# Patient Record
Sex: MALE | Race: WHITE | Employment: FULL TIME | URBAN - METROPOLITAN AREA
[De-identification: names, ages, dates, MRNs, and addresses within clinical notes are randomized per-mention and may not be internally consistent; named-entity substitution may affect disease eponyms.]

---

## 2017-01-10 ENCOUNTER — OFFICE VISIT (OUTPATIENT)
Dept: INTERNAL MEDICINE CLINIC | Age: 22
End: 2017-01-10

## 2017-01-10 VITALS
SYSTOLIC BLOOD PRESSURE: 100 MMHG | HEART RATE: 153 BPM | BODY MASS INDEX: 24.59 KG/M2 | WEIGHT: 153 LBS | HEIGHT: 66 IN | OXYGEN SATURATION: 98 % | DIASTOLIC BLOOD PRESSURE: 62 MMHG

## 2017-01-10 DIAGNOSIS — F41.9 ANXIETY: ICD-10-CM

## 2017-01-10 DIAGNOSIS — Z76.89 ENCOUNTER TO ESTABLISH CARE: ICD-10-CM

## 2017-01-10 DIAGNOSIS — I49.9 CARDIAC ARRHYTHMIA, UNSPECIFIED CARDIAC ARRHYTHMIA TYPE: Primary | ICD-10-CM

## 2017-01-10 PROCEDURE — 36415 COLL VENOUS BLD VENIPUNCTURE: CPT | Performed by: NURSE PRACTITIONER

## 2017-01-10 PROCEDURE — 99203 OFFICE O/P NEW LOW 30 MIN: CPT | Performed by: NURSE PRACTITIONER

## 2017-01-10 PROCEDURE — 93000 ELECTROCARDIOGRAM COMPLETE: CPT | Performed by: NURSE PRACTITIONER

## 2017-01-10 RX ORDER — BUSPIRONE HYDROCHLORIDE 5 MG/1
5 TABLET ORAL 3 TIMES DAILY
Qty: 90 TABLET | Refills: 1 | Status: SHIPPED | OUTPATIENT
Start: 2017-01-10 | End: 2017-04-24

## 2017-01-10 ASSESSMENT — PATIENT HEALTH QUESTIONNAIRE - PHQ9
SUM OF ALL RESPONSES TO PHQ QUESTIONS 1-9: 17
5. POOR APPETITE OR OVEREATING: 1
8. MOVING OR SPEAKING SO SLOWLY THAT OTHER PEOPLE COULD HAVE NOTICED. OR THE OPPOSITE, BEING SO FIGETY OR RESTLESS THAT YOU HAVE BEEN MOVING AROUND A LOT MORE THAN USUAL: 3
1. LITTLE INTEREST OR PLEASURE IN DOING THINGS: 2
7. TROUBLE CONCENTRATING ON THINGS, SUCH AS READING THE NEWSPAPER OR WATCHING TELEVISION: 3
9. THOUGHTS THAT YOU WOULD BE BETTER OFF DEAD, OR OF HURTING YOURSELF: 0
4. FEELING TIRED OR HAVING LITTLE ENERGY: 3
3. TROUBLE FALLING OR STAYING ASLEEP: 2
6. FEELING BAD ABOUT YOURSELF - OR THAT YOU ARE A FAILURE OR HAVE LET YOURSELF OR YOUR FAMILY DOWN: 1
2. FEELING DOWN, DEPRESSED OR HOPELESS: 2
10. IF YOU CHECKED OFF ANY PROBLEMS, HOW DIFFICULT HAVE THESE PROBLEMS MADE IT FOR YOU TO DO YOUR WORK, TAKE CARE OF THINGS AT HOME, OR GET ALONG WITH OTHER PEOPLE: 1
SUM OF ALL RESPONSES TO PHQ9 QUESTIONS 1 & 2: 4

## 2017-01-11 ENCOUNTER — TELEPHONE (OUTPATIENT)
Dept: INTERNAL MEDICINE CLINIC | Age: 22
End: 2017-01-11

## 2017-01-11 ENCOUNTER — OFFICE VISIT (OUTPATIENT)
Dept: PSYCHOLOGY | Age: 22
End: 2017-01-11

## 2017-01-11 DIAGNOSIS — F33.1 MODERATE EPISODE OF RECURRENT MAJOR DEPRESSIVE DISORDER (HCC): Primary | ICD-10-CM

## 2017-01-11 DIAGNOSIS — F40.10 SOCIAL ANXIETY DISORDER: ICD-10-CM

## 2017-01-11 LAB
HEPATITIS C ANTIBODY INTERPRETATION: NORMAL
HIV-1 AND HIV-2 ANTIBODIES: NORMAL
VITAMIN D 25-HYDROXY: 28.7 NG/ML

## 2017-01-11 PROCEDURE — 90791 PSYCH DIAGNOSTIC EVALUATION: CPT | Performed by: PSYCHOLOGIST

## 2017-01-11 ASSESSMENT — PATIENT HEALTH QUESTIONNAIRE - PHQ9
1. LITTLE INTEREST OR PLEASURE IN DOING THINGS: 2
6. FEELING BAD ABOUT YOURSELF - OR THAT YOU ARE A FAILURE OR HAVE LET YOURSELF OR YOUR FAMILY DOWN: 1
10. IF YOU CHECKED OFF ANY PROBLEMS, HOW DIFFICULT HAVE THESE PROBLEMS MADE IT FOR YOU TO DO YOUR WORK, TAKE CARE OF THINGS AT HOME, OR GET ALONG WITH OTHER PEOPLE: 1
SUM OF ALL RESPONSES TO PHQ QUESTIONS 1-9: 16
2. FEELING DOWN, DEPRESSED OR HOPELESS: 2
9. THOUGHTS THAT YOU WOULD BE BETTER OFF DEAD, OR OF HURTING YOURSELF: 1
SUM OF ALL RESPONSES TO PHQ9 QUESTIONS 1 & 2: 4
3. TROUBLE FALLING OR STAYING ASLEEP: 1
8. MOVING OR SPEAKING SO SLOWLY THAT OTHER PEOPLE COULD HAVE NOTICED. OR THE OPPOSITE, BEING SO FIGETY OR RESTLESS THAT YOU HAVE BEEN MOVING AROUND A LOT MORE THAN USUAL: 3
5. POOR APPETITE OR OVEREATING: 1
7. TROUBLE CONCENTRATING ON THINGS, SUCH AS READING THE NEWSPAPER OR WATCHING TELEVISION: 3
4. FEELING TIRED OR HAVING LITTLE ENERGY: 2

## 2017-01-12 PROBLEM — F33.1 MODERATE EPISODE OF RECURRENT MAJOR DEPRESSIVE DISORDER (HCC): Status: ACTIVE | Noted: 2017-01-12

## 2017-01-12 PROBLEM — F40.10 SOCIAL ANXIETY DISORDER: Status: ACTIVE | Noted: 2017-01-12

## 2017-02-03 ENCOUNTER — OFFICE VISIT (OUTPATIENT)
Dept: PSYCHOLOGY | Age: 22
End: 2017-02-03

## 2017-02-03 DIAGNOSIS — F40.10 SOCIAL ANXIETY DISORDER: Primary | ICD-10-CM

## 2017-02-03 PROCEDURE — 90832 PSYTX W PT 30 MINUTES: CPT | Performed by: PSYCHOLOGIST

## 2017-02-09 ENCOUNTER — OFFICE VISIT (OUTPATIENT)
Dept: INTERNAL MEDICINE CLINIC | Age: 22
End: 2017-02-09

## 2017-02-09 VITALS
OXYGEN SATURATION: 99 % | TEMPERATURE: 98.4 F | BODY MASS INDEX: 25.89 KG/M2 | WEIGHT: 158 LBS | DIASTOLIC BLOOD PRESSURE: 68 MMHG | SYSTOLIC BLOOD PRESSURE: 104 MMHG | HEART RATE: 89 BPM

## 2017-02-09 DIAGNOSIS — H66.93 BILATERAL OTITIS MEDIA, UNSPECIFIED CHRONICITY, UNSPECIFIED OTITIS MEDIA TYPE: Primary | ICD-10-CM

## 2017-02-09 DIAGNOSIS — R09.81 CONGESTION OF NASAL SINUS: ICD-10-CM

## 2017-02-09 PROCEDURE — 99213 OFFICE O/P EST LOW 20 MIN: CPT | Performed by: NURSE PRACTITIONER

## 2017-02-09 RX ORDER — FLUTICASONE PROPIONATE 50 MCG
1 SPRAY, SUSPENSION (ML) NASAL DAILY
Qty: 1 BOTTLE | Refills: 3 | Status: SHIPPED | OUTPATIENT
Start: 2017-02-09 | End: 2017-03-03

## 2017-02-09 ASSESSMENT — ENCOUNTER SYMPTOMS
SINUS PRESSURE: 1
SINUS COMPLAINT: 1
COUGH: 0

## 2017-03-03 ENCOUNTER — OFFICE VISIT (OUTPATIENT)
Dept: ENT CLINIC | Age: 22
End: 2017-03-03

## 2017-03-03 ENCOUNTER — OFFICE VISIT (OUTPATIENT)
Dept: PSYCHOLOGY | Age: 22
End: 2017-03-03

## 2017-03-03 VITALS
DIASTOLIC BLOOD PRESSURE: 64 MMHG | HEART RATE: 84 BPM | WEIGHT: 155 LBS | SYSTOLIC BLOOD PRESSURE: 107 MMHG | HEIGHT: 66 IN | BODY MASS INDEX: 24.91 KG/M2

## 2017-03-03 DIAGNOSIS — H69.83 EUSTACHIAN TUBE DYSFUNCTION, BILATERAL: Primary | ICD-10-CM

## 2017-03-03 DIAGNOSIS — F40.10 SOCIAL ANXIETY DISORDER: Primary | ICD-10-CM

## 2017-03-03 DIAGNOSIS — F33.1 MODERATE EPISODE OF RECURRENT MAJOR DEPRESSIVE DISORDER (HCC): ICD-10-CM

## 2017-03-03 PROBLEM — H69.90 EUSTACHIAN TUBE DYSFUNCTION: Status: ACTIVE | Noted: 2017-03-03

## 2017-03-03 PROBLEM — H69.80 EUSTACHIAN TUBE DYSFUNCTION: Status: ACTIVE | Noted: 2017-03-03

## 2017-03-03 PROCEDURE — 90832 PSYTX W PT 30 MINUTES: CPT | Performed by: PSYCHOLOGIST

## 2017-03-03 PROCEDURE — 99203 OFFICE O/P NEW LOW 30 MIN: CPT | Performed by: OTOLARYNGOLOGY

## 2017-03-03 RX ORDER — NEOMYCIN SULFATE, POLYMYXIN B SULFATE, HYDROCORTISONE 3.5; 10000; 1 MG/ML; [USP'U]/ML; MG/ML
1 SOLUTION/ DROPS AURICULAR (OTIC)
COMMUNITY
End: 2018-05-07

## 2017-03-03 RX ORDER — FLUTICASONE PROPIONATE 50 MCG
1 SPRAY, SUSPENSION (ML) NASAL DAILY
Qty: 1 BOTTLE | Refills: 0 | Status: SHIPPED | OUTPATIENT
Start: 2017-03-03 | End: 2018-05-07

## 2017-03-03 RX ORDER — METHYLPREDNISOLONE 4 MG/1
TABLET ORAL
Qty: 1 KIT | Refills: 0 | Status: SHIPPED | OUTPATIENT
Start: 2017-03-03 | End: 2017-03-09

## 2017-04-07 ENCOUNTER — OFFICE VISIT (OUTPATIENT)
Dept: PSYCHOLOGY | Age: 22
End: 2017-04-07

## 2017-04-07 DIAGNOSIS — F40.10 SOCIAL ANXIETY DISORDER: Primary | ICD-10-CM

## 2017-04-07 DIAGNOSIS — F33.1 MODERATE EPISODE OF RECURRENT MAJOR DEPRESSIVE DISORDER (HCC): ICD-10-CM

## 2017-04-07 PROCEDURE — 90832 PSYTX W PT 30 MINUTES: CPT | Performed by: PSYCHOLOGIST

## 2017-04-24 ENCOUNTER — OFFICE VISIT (OUTPATIENT)
Dept: INTERNAL MEDICINE CLINIC | Age: 22
End: 2017-04-24

## 2017-04-24 VITALS
SYSTOLIC BLOOD PRESSURE: 100 MMHG | DIASTOLIC BLOOD PRESSURE: 60 MMHG | BODY MASS INDEX: 25.18 KG/M2 | OXYGEN SATURATION: 99 % | HEART RATE: 94 BPM | WEIGHT: 156 LBS

## 2017-04-24 DIAGNOSIS — F32.1 MODERATE MAJOR DEPRESSION, SINGLE EPISODE (HCC): ICD-10-CM

## 2017-04-24 DIAGNOSIS — F40.10 SOCIAL ANXIETY DISORDER: ICD-10-CM

## 2017-04-24 DIAGNOSIS — F33.1 MODERATE EPISODE OF RECURRENT MAJOR DEPRESSIVE DISORDER (HCC): Primary | ICD-10-CM

## 2017-04-24 DIAGNOSIS — Z13.31 POSITIVE DEPRESSION SCREENING: ICD-10-CM

## 2017-04-24 PROCEDURE — 99213 OFFICE O/P EST LOW 20 MIN: CPT | Performed by: NURSE PRACTITIONER

## 2017-04-24 PROCEDURE — G8431 POS CLIN DEPRES SCRN F/U DOC: HCPCS | Performed by: NURSE PRACTITIONER

## 2017-04-24 RX ORDER — CITALOPRAM 10 MG/1
10 TABLET ORAL DAILY
Qty: 30 TABLET | Refills: 1 | Status: SHIPPED | OUTPATIENT
Start: 2017-04-24 | End: 2017-06-19 | Stop reason: SDUPTHER

## 2017-05-12 ENCOUNTER — OFFICE VISIT (OUTPATIENT)
Dept: PSYCHOLOGY | Age: 22
End: 2017-05-12

## 2017-05-12 DIAGNOSIS — F40.10 SOCIAL ANXIETY DISORDER: Primary | ICD-10-CM

## 2017-05-12 PROCEDURE — 90832 PSYTX W PT 30 MINUTES: CPT | Performed by: PSYCHOLOGIST

## 2017-06-09 ENCOUNTER — OFFICE VISIT (OUTPATIENT)
Dept: PSYCHOLOGY | Age: 22
End: 2017-06-09

## 2017-06-09 DIAGNOSIS — F40.10 SOCIAL ANXIETY DISORDER: Primary | ICD-10-CM

## 2017-06-09 PROCEDURE — 99999 PR OFFICE/OUTPT VISIT,PROCEDURE ONLY: CPT | Performed by: PSYCHOLOGIST

## 2017-06-19 ENCOUNTER — OFFICE VISIT (OUTPATIENT)
Dept: INTERNAL MEDICINE CLINIC | Age: 22
End: 2017-06-19

## 2017-06-19 VITALS
HEART RATE: 89 BPM | OXYGEN SATURATION: 99 % | SYSTOLIC BLOOD PRESSURE: 120 MMHG | WEIGHT: 164 LBS | BODY MASS INDEX: 26.47 KG/M2 | DIASTOLIC BLOOD PRESSURE: 60 MMHG

## 2017-06-19 DIAGNOSIS — B35.3 TINEA PEDIS OF RIGHT FOOT: ICD-10-CM

## 2017-06-19 DIAGNOSIS — F33.1 MODERATE EPISODE OF RECURRENT MAJOR DEPRESSIVE DISORDER (HCC): Primary | ICD-10-CM

## 2017-06-19 DIAGNOSIS — F40.10 SOCIAL ANXIETY DISORDER: ICD-10-CM

## 2017-06-19 PROCEDURE — 99213 OFFICE O/P EST LOW 20 MIN: CPT | Performed by: NURSE PRACTITIONER

## 2017-06-19 RX ORDER — CITALOPRAM 10 MG/1
10 TABLET ORAL DAILY
Qty: 30 TABLET | Refills: 3 | Status: SHIPPED | OUTPATIENT
Start: 2017-06-19 | End: 2017-09-18 | Stop reason: SDUPTHER

## 2017-06-19 ASSESSMENT — ENCOUNTER SYMPTOMS: COLOR CHANGE: 1

## 2017-09-18 ENCOUNTER — OFFICE VISIT (OUTPATIENT)
Dept: INTERNAL MEDICINE CLINIC | Age: 22
End: 2017-09-18

## 2017-09-18 VITALS
WEIGHT: 173 LBS | BODY MASS INDEX: 27.92 KG/M2 | SYSTOLIC BLOOD PRESSURE: 104 MMHG | DIASTOLIC BLOOD PRESSURE: 70 MMHG | OXYGEN SATURATION: 99 % | HEART RATE: 78 BPM

## 2017-09-18 DIAGNOSIS — F33.1 MODERATE EPISODE OF RECURRENT MAJOR DEPRESSIVE DISORDER (HCC): Primary | ICD-10-CM

## 2017-09-18 DIAGNOSIS — R53.83 FATIGUE, UNSPECIFIED TYPE: ICD-10-CM

## 2017-09-18 DIAGNOSIS — F40.10 SOCIAL ANXIETY DISORDER: ICD-10-CM

## 2017-09-18 DIAGNOSIS — Z13.1 SCREENING FOR DIABETES MELLITUS: ICD-10-CM

## 2017-09-18 LAB
ALBUMIN SERPL-MCNC: 4.7 G/DL (ref 3.4–5)
ANION GAP SERPL CALCULATED.3IONS-SCNC: 13 MMOL/L (ref 3–16)
BUN BLDV-MCNC: 8 MG/DL (ref 7–20)
CALCIUM SERPL-MCNC: 9.8 MG/DL (ref 8.3–10.6)
CHLORIDE BLD-SCNC: 104 MMOL/L (ref 99–110)
CO2: 27 MMOL/L (ref 21–32)
CREAT SERPL-MCNC: 0.7 MG/DL (ref 0.9–1.3)
ESTIMATED AVERAGE GLUCOSE: 82.5 MG/DL
GFR AFRICAN AMERICAN: >60
GFR NON-AFRICAN AMERICAN: >60
GLUCOSE BLD-MCNC: 89 MG/DL (ref 70–99)
HBA1C MFR BLD: 4.5 %
PHOSPHORUS: 2.7 MG/DL (ref 2.5–4.9)
POTASSIUM SERPL-SCNC: 4.6 MMOL/L (ref 3.5–5.1)
SODIUM BLD-SCNC: 144 MMOL/L (ref 136–145)
TSH SERPL DL<=0.05 MIU/L-ACNC: 1.27 UIU/ML (ref 0.27–4.2)

## 2017-09-18 PROCEDURE — 99213 OFFICE O/P EST LOW 20 MIN: CPT | Performed by: NURSE PRACTITIONER

## 2017-09-18 RX ORDER — CITALOPRAM 10 MG/1
10 TABLET ORAL DAILY
Qty: 90 TABLET | Refills: 1 | Status: SHIPPED | OUTPATIENT
Start: 2017-09-18 | End: 2017-11-20 | Stop reason: SDUPTHER

## 2017-11-20 ENCOUNTER — OFFICE VISIT (OUTPATIENT)
Dept: INTERNAL MEDICINE CLINIC | Age: 22
End: 2017-11-20

## 2017-11-20 VITALS
BODY MASS INDEX: 28.41 KG/M2 | DIASTOLIC BLOOD PRESSURE: 60 MMHG | SYSTOLIC BLOOD PRESSURE: 114 MMHG | WEIGHT: 176 LBS | OXYGEN SATURATION: 97 % | HEART RATE: 90 BPM

## 2017-11-20 DIAGNOSIS — F33.1 MODERATE EPISODE OF RECURRENT MAJOR DEPRESSIVE DISORDER (HCC): Primary | ICD-10-CM

## 2017-11-20 DIAGNOSIS — F40.10 SOCIAL ANXIETY DISORDER: ICD-10-CM

## 2017-11-20 PROCEDURE — 99213 OFFICE O/P EST LOW 20 MIN: CPT | Performed by: NURSE PRACTITIONER

## 2017-11-20 RX ORDER — NEOMYCIN SULFATE, POLYMYXIN B SULFATE, HYDROCORTISONE 3.5; 10000; 1 MG/ML; [USP'U]/ML; MG/ML
SOLUTION/ DROPS AURICULAR (OTIC)
Status: CANCELLED | OUTPATIENT
Start: 2017-11-20

## 2017-11-20 RX ORDER — FLUTICASONE PROPIONATE 50 MCG
1 SPRAY, SUSPENSION (ML) NASAL DAILY
Qty: 1 BOTTLE | Refills: 0 | Status: CANCELLED | OUTPATIENT
Start: 2017-11-20

## 2017-11-20 RX ORDER — CITALOPRAM 20 MG/1
20 TABLET ORAL DAILY
Qty: 30 TABLET | Refills: 3 | Status: SHIPPED | OUTPATIENT
Start: 2017-11-20 | End: 2018-05-07 | Stop reason: SDUPTHER

## 2017-11-20 NOTE — PROGRESS NOTES
Subjective:      Patient ID: Jeffrey Mohamud is a 23 y.o. male. Patient presents with:  Depression: F/U-Depression  Other: Pt complaints of knot still on Rt foot and is painful  Discuss Medications: Pt would like to discuss medication celexa    Presents today for follow-up on depression,        Review of Systems   Psychiatric/Behavioral: Positive for sleep disturbance. The patient is nervous/anxious. All other systems reviewed and are negative. Vitals:    11/20/17 1615   BP: 114/60   Pulse: 90   SpO2: 97%       Objective:   Physical Exam   Constitutional: He is oriented to person, place, and time. He appears well-developed and well-nourished. Neck: Normal range of motion. Neck supple. Neurological: He is alert and oriented to person, place, and time. Skin: Skin is warm and dry. Psychiatric: His speech is normal. Judgment and thought content normal. His mood appears anxious. He is withdrawn. Cognition and memory are normal. He exhibits a depressed mood.    PHQ 9 = 19  ALLY 7 = 15  Discussed reasons for his increased depression, states it's a combination of weather, job well and that he is going to have on and that seemed to snap himself out we will again refer back to Dr. Timmy Damon and increase Celexa to see if this will not help him to become more interactive       Assessment:      Depression  Anxiety      Plan:      Exercise at least twice a week  Take Citalopram as written  Increase water intake  Remove TV from bedroom  Remember only go into bedroom to sleep

## 2017-12-01 ENCOUNTER — OFFICE VISIT (OUTPATIENT)
Dept: PSYCHOLOGY | Age: 22
End: 2017-12-01

## 2017-12-01 DIAGNOSIS — F33.1 MODERATE EPISODE OF RECURRENT MAJOR DEPRESSIVE DISORDER (HCC): Primary | ICD-10-CM

## 2017-12-01 DIAGNOSIS — F40.10 SOCIAL ANXIETY DISORDER: ICD-10-CM

## 2017-12-01 PROCEDURE — 90832 PSYTX W PT 30 MINUTES: CPT | Performed by: PSYCHOLOGIST

## 2017-12-01 NOTE — PATIENT INSTRUCTIONS
1. Go to the gym: 1x/wk while home for 30 minutes. 2. Check out online computer classes. 3. Follow-up with Dr. Robbin Hdz in 4 weeks, or sooner, if necessary.

## 2017-12-01 NOTE — PROGRESS NOTES
Behavioral Health Consultation  Reyes Montane, Ph.D.  Psychologist  12/1/2017  10:36 AM      Time spent with Patient: 30 minutes  This is patient's seventh  [facility] appointment. Reason for Consult:    Chief Complaint   Patient presents with    Anxiety     Referring Provider: Gallo Beverly, [address], [address]    Feedback given to PCP. S:  Pt seen for f/u of anxiety and depression. Was having job stress when company was bought out, still uncertain. Broke up w GF bc of long distance, ended about 2 mos ago. In response to these two stressors, reported exacerbation in depression and anxiety. Noted his mind is running all the time, finding it difficult to do activites such as exercise, which he formerly did nearly daily. Started celexa new dose today. 4-5 beers a few nights/wk (10 beers/wk).  Discussing monitoring etoh use, setting exercise goal.     O:  MSE:    Appearance    alert, cooperative  Appetite abnormal: increased comfort eating  Sleep disturbance No  Fatigue No  Loss of pleasure Yes  Impulsive behavior No  Speech    spontaneous, normal rate, normal volume and well articulated  Mood    Depressed  Affect    depressed affect  Thought Content    intact and cognitive distortions  Thought Process    linear, goal directed and coherent  Associations    logical connections  Insight    Good  Judgment    Intact  Orientation    oriented to person, place, time, and general circumstances  Memory    recent and remote memory intact  Attention/Concentration    intact  Morbid ideation No  Suicide Assessment    no suicidal ideation    History:    Medications:   Current Outpatient Prescriptions   Medication Sig Dispense Refill    citalopram (CELEXA) 20 MG tablet Take 1 tablet by mouth daily 30 tablet 3    neomycin-polymyxin-hydrocortisone 1 % SOLN otic solution 1 %      fluticasone (FLONASE) 50 MCG/ACT nasal spray 1 spray by Nasal route daily 1 Bottle 0     No current facility-administered

## 2018-05-07 ENCOUNTER — OFFICE VISIT (OUTPATIENT)
Dept: INTERNAL MEDICINE CLINIC | Age: 23
End: 2018-05-07

## 2018-05-07 VITALS
SYSTOLIC BLOOD PRESSURE: 94 MMHG | WEIGHT: 166 LBS | BODY MASS INDEX: 26.79 KG/M2 | OXYGEN SATURATION: 98 % | HEART RATE: 81 BPM | DIASTOLIC BLOOD PRESSURE: 60 MMHG

## 2018-05-07 DIAGNOSIS — F33.1 MODERATE EPISODE OF RECURRENT MAJOR DEPRESSIVE DISORDER (HCC): Primary | ICD-10-CM

## 2018-05-07 DIAGNOSIS — F40.10 SOCIAL ANXIETY DISORDER: ICD-10-CM

## 2018-05-07 PROCEDURE — 99213 OFFICE O/P EST LOW 20 MIN: CPT | Performed by: INTERNAL MEDICINE

## 2018-05-07 RX ORDER — CITALOPRAM 20 MG/1
20 TABLET ORAL DAILY
Qty: 30 TABLET | Refills: 5 | Status: SHIPPED | OUTPATIENT
Start: 2018-05-07

## 2018-05-07 RX ORDER — ARIPIPRAZOLE 5 MG/1
5 TABLET ORAL DAILY
Qty: 30 TABLET | Refills: 5 | Status: SHIPPED | OUTPATIENT
Start: 2018-05-07 | End: 2018-05-29 | Stop reason: SDUPTHER

## 2018-05-07 ASSESSMENT — ANXIETY QUESTIONNAIRES
2. NOT BEING ABLE TO STOP OR CONTROL WORRYING: 3-NEARLY EVERY DAY
3. WORRYING TOO MUCH ABOUT DIFFERENT THINGS: 3-NEARLY EVERY DAY
7. FEELING AFRAID AS IF SOMETHING AWFUL MIGHT HAPPEN: 2-OVER HALF THE DAYS
6. BECOMING EASILY ANNOYED OR IRRITABLE: 2-OVER HALF THE DAYS
4. TROUBLE RELAXING: 2-OVER HALF THE DAYS
1. FEELING NERVOUS, ANXIOUS, OR ON EDGE: 3-NEARLY EVERY DAY
5. BEING SO RESTLESS THAT IT IS HARD TO SIT STILL: 2-OVER HALF THE DAYS
GAD7 TOTAL SCORE: 17

## 2018-05-07 ASSESSMENT — PATIENT HEALTH QUESTIONNAIRE - PHQ9
3. TROUBLE FALLING OR STAYING ASLEEP: 1
5. POOR APPETITE OR OVEREATING: 0
9. THOUGHTS THAT YOU WOULD BE BETTER OFF DEAD, OR OF HURTING YOURSELF: 2
4. FEELING TIRED OR HAVING LITTLE ENERGY: 2
8. MOVING OR SPEAKING SO SLOWLY THAT OTHER PEOPLE COULD HAVE NOTICED. OR THE OPPOSITE, BEING SO FIGETY OR RESTLESS THAT YOU HAVE BEEN MOVING AROUND A LOT MORE THAN USUAL: 2
6. FEELING BAD ABOUT YOURSELF - OR THAT YOU ARE A FAILURE OR HAVE LET YOURSELF OR YOUR FAMILY DOWN: 2
2. FEELING DOWN, DEPRESSED OR HOPELESS: 3
10. IF YOU CHECKED OFF ANY PROBLEMS, HOW DIFFICULT HAVE THESE PROBLEMS MADE IT FOR YOU TO DO YOUR WORK, TAKE CARE OF THINGS AT HOME, OR GET ALONG WITH OTHER PEOPLE: 1
1. LITTLE INTEREST OR PLEASURE IN DOING THINGS: 2
SUM OF ALL RESPONSES TO PHQ QUESTIONS 1-9: 16
7. TROUBLE CONCENTRATING ON THINGS, SUCH AS READING THE NEWSPAPER OR WATCHING TELEVISION: 2
SUM OF ALL RESPONSES TO PHQ9 QUESTIONS 1 & 2: 5

## 2018-05-29 ENCOUNTER — OFFICE VISIT (OUTPATIENT)
Dept: INTERNAL MEDICINE CLINIC | Age: 23
End: 2018-05-29

## 2018-05-29 VITALS
WEIGHT: 166 LBS | DIASTOLIC BLOOD PRESSURE: 60 MMHG | HEART RATE: 80 BPM | SYSTOLIC BLOOD PRESSURE: 102 MMHG | BODY MASS INDEX: 26.79 KG/M2

## 2018-05-29 DIAGNOSIS — F33.1 MODERATE EPISODE OF RECURRENT MAJOR DEPRESSIVE DISORDER (HCC): Primary | ICD-10-CM

## 2018-05-29 PROCEDURE — G0444 DEPRESSION SCREEN ANNUAL: HCPCS | Performed by: INTERNAL MEDICINE

## 2018-05-29 PROCEDURE — 99214 OFFICE O/P EST MOD 30 MIN: CPT | Performed by: INTERNAL MEDICINE

## 2018-05-29 RX ORDER — ARIPIPRAZOLE 5 MG/1
5 TABLET ORAL DAILY
Qty: 30 TABLET | Refills: 11 | Status: SHIPPED | OUTPATIENT
Start: 2018-05-29

## 2018-05-29 ASSESSMENT — PATIENT HEALTH QUESTIONNAIRE - PHQ9
4. FEELING TIRED OR HAVING LITTLE ENERGY: 1
2. FEELING DOWN, DEPRESSED OR HOPELESS: 3
8. MOVING OR SPEAKING SO SLOWLY THAT OTHER PEOPLE COULD HAVE NOTICED. OR THE OPPOSITE, BEING SO FIGETY OR RESTLESS THAT YOU HAVE BEEN MOVING AROUND A LOT MORE THAN USUAL: 2
10. IF YOU CHECKED OFF ANY PROBLEMS, HOW DIFFICULT HAVE THESE PROBLEMS MADE IT FOR YOU TO DO YOUR WORK, TAKE CARE OF THINGS AT HOME, OR GET ALONG WITH OTHER PEOPLE: 2
SUM OF ALL RESPONSES TO PHQ9 QUESTIONS 1 & 2: 6
1. LITTLE INTEREST OR PLEASURE IN DOING THINGS: 3
6. FEELING BAD ABOUT YOURSELF - OR THAT YOU ARE A FAILURE OR HAVE LET YOURSELF OR YOUR FAMILY DOWN: 2
7. TROUBLE CONCENTRATING ON THINGS, SUCH AS READING THE NEWSPAPER OR WATCHING TELEVISION: 2
SUM OF ALL RESPONSES TO PHQ QUESTIONS 1-9: 19
3. TROUBLE FALLING OR STAYING ASLEEP: 1
5. POOR APPETITE OR OVEREATING: 2
9. THOUGHTS THAT YOU WOULD BE BETTER OFF DEAD, OR OF HURTING YOURSELF: 3

## 2018-05-29 ASSESSMENT — ENCOUNTER SYMPTOMS
EYE PAIN: 0
EYE REDNESS: 0

## 2018-06-01 ENCOUNTER — TELEPHONE (OUTPATIENT)
Dept: INTERNAL MEDICINE CLINIC | Age: 23
End: 2018-06-01

## 2025-01-07 ENCOUNTER — ANESTHESIA (OUTPATIENT)
Dept: CARDIAC CATH/INVASIVE PROCEDURES | Age: 30
DRG: 309 | End: 2025-01-07
Payer: COMMERCIAL

## 2025-01-07 ENCOUNTER — TELEPHONE (OUTPATIENT)
Dept: CARDIAC CATH/INVASIVE PROCEDURES | Age: 30
End: 2025-01-07

## 2025-01-07 ENCOUNTER — TELEPHONE (OUTPATIENT)
Dept: CARDIOLOGY CLINIC | Age: 30
End: 2025-01-07

## 2025-01-07 ENCOUNTER — ANCILLARY PROCEDURE (OUTPATIENT)
Dept: CARDIOLOGY CLINIC | Age: 30
End: 2025-01-07
Payer: COMMERCIAL

## 2025-01-07 ENCOUNTER — HOSPITAL ENCOUNTER (EMERGENCY)
Dept: CARDIAC CATH/INVASIVE PROCEDURES | Age: 30
Discharge: HOME | DRG: 309 | End: 2025-01-09
Attending: INTERNAL MEDICINE
Payer: COMMERCIAL

## 2025-01-07 ENCOUNTER — APPOINTMENT (OUTPATIENT)
Dept: GENERAL RADIOLOGY | Age: 30
DRG: 309 | End: 2025-01-07
Payer: COMMERCIAL

## 2025-01-07 ENCOUNTER — HOSPITAL ENCOUNTER (INPATIENT)
Age: 30
LOS: 1 days | Discharge: HOME | DRG: 309 | End: 2025-01-08
Attending: EMERGENCY MEDICINE | Admitting: STUDENT IN AN ORGANIZED HEALTH CARE EDUCATION/TRAINING PROGRAM
Payer: COMMERCIAL

## 2025-01-07 ENCOUNTER — ANESTHESIA EVENT (OUTPATIENT)
Dept: CARDIAC CATH/INVASIVE PROCEDURES | Age: 30
DRG: 309 | End: 2025-01-07
Payer: COMMERCIAL

## 2025-01-07 VITALS
HEART RATE: 117 BPM | DIASTOLIC BLOOD PRESSURE: 68 MMHG | RESPIRATION RATE: 17 BRPM | OXYGEN SATURATION: 95 % | SYSTOLIC BLOOD PRESSURE: 92 MMHG

## 2025-01-07 DIAGNOSIS — I48.0 PAROXYSMAL ATRIAL FIBRILLATION (HCC): ICD-10-CM

## 2025-01-07 DIAGNOSIS — I48.91 ATRIAL FIBRILLATION, UNSPECIFIED TYPE (HCC): Primary | ICD-10-CM

## 2025-01-07 DIAGNOSIS — I48.91 ATRIAL FIBRILLATION, UNSPECIFIED TYPE (HCC): ICD-10-CM

## 2025-01-07 DIAGNOSIS — I42.9 CARDIOMYOPATHY, UNSPECIFIED TYPE (HCC): ICD-10-CM

## 2025-01-07 DIAGNOSIS — I48.91 ATRIAL FIBRILLATION WITH RAPID VENTRICULAR RESPONSE (HCC): ICD-10-CM

## 2025-01-07 LAB
ALBUMIN SERPL-MCNC: 4.9 G/DL (ref 3.4–5)
ALBUMIN/GLOB SERPL: 1.9 {RATIO} (ref 1.1–2.2)
ALP SERPL-CCNC: 60 U/L (ref 40–129)
ALT SERPL-CCNC: 62 U/L (ref 10–40)
AMPHETAMINES UR QL SCN>1000 NG/ML: ABNORMAL
ANION GAP SERPL CALCULATED.3IONS-SCNC: 17 MMOL/L (ref 3–16)
AST SERPL-CCNC: 37 U/L (ref 15–37)
BARBITURATES UR QL SCN>200 NG/ML: ABNORMAL
BASOPHILS # BLD: 0.1 K/UL (ref 0–0.2)
BASOPHILS NFR BLD: 0.7 %
BENZODIAZ UR QL SCN>200 NG/ML: POSITIVE
BILIRUB SERPL-MCNC: 1.5 MG/DL (ref 0–1)
BILIRUB UR QL STRIP.AUTO: NEGATIVE
BUN SERPL-MCNC: 13 MG/DL (ref 7–20)
CALCIUM SERPL-MCNC: 10.1 MG/DL (ref 8.3–10.6)
CANNABINOIDS UR QL SCN>50 NG/ML: ABNORMAL
CHLORIDE SERPL-SCNC: 101 MMOL/L (ref 99–110)
CLARITY UR: CLEAR
CO2 SERPL-SCNC: 22 MMOL/L (ref 21–32)
COCAINE UR QL SCN: ABNORMAL
COLOR UR: YELLOW
CREAT SERPL-MCNC: 1.2 MG/DL (ref 0.9–1.3)
DEPRECATED RDW RBC AUTO: 13 % (ref 12.4–15.4)
DRUG SCREEN COMMENT UR-IMP: ABNORMAL
EKG ATRIAL RATE: 121 BPM
EKG DIAGNOSIS: NORMAL
EKG P AXIS: 44 DEGREES
EKG P-R INTERVAL: 148 MS
EKG Q-T INTERVAL: 246 MS
EKG Q-T INTERVAL: 324 MS
EKG Q-T INTERVAL: 334 MS
EKG Q-T INTERVAL: 350 MS
EKG QRS DURATION: 78 MS
EKG QRS DURATION: 86 MS
EKG QRS DURATION: 86 MS
EKG QRS DURATION: 88 MS
EKG QTC CALCULATION (BAZETT): 439 MS
EKG QTC CALCULATION (BAZETT): 449 MS
EKG QTC CALCULATION (BAZETT): 460 MS
EKG QTC CALCULATION (BAZETT): 506 MS
EKG R AXIS: 135 DEGREES
EKG R AXIS: 36 DEGREES
EKG R AXIS: 39 DEGREES
EKG R AXIS: 76 DEGREES
EKG T AXIS: -14 DEGREES
EKG T AXIS: 162 DEGREES
EKG T AXIS: 27 DEGREES
EKG T AXIS: 28 DEGREES
EKG VENTRICULAR RATE: 109 BPM
EKG VENTRICULAR RATE: 121 BPM
EKG VENTRICULAR RATE: 126 BPM
EKG VENTRICULAR RATE: 192 BPM
EOSINOPHIL # BLD: 0.1 K/UL (ref 0–0.6)
EOSINOPHIL NFR BLD: 0.7 %
ETHANOLAMINE SERPL-MCNC: NORMAL MG/DL (ref 0–0.08)
FENTANYL SCREEN, URINE: ABNORMAL
GFR SERPLBLD CREATININE-BSD FMLA CKD-EPI: 83 ML/MIN/{1.73_M2}
GLUCOSE SERPL-MCNC: 95 MG/DL (ref 70–99)
GLUCOSE UR STRIP.AUTO-MCNC: NEGATIVE MG/DL
HCT VFR BLD AUTO: 50.8 % (ref 40.5–52.5)
HGB BLD-MCNC: 17.9 G/DL (ref 13.5–17.5)
HGB UR QL STRIP.AUTO: ABNORMAL
KETONES UR STRIP.AUTO-MCNC: 40 MG/DL
LEUKOCYTE ESTERASE UR QL STRIP.AUTO: NEGATIVE
LYMPHOCYTES # BLD: 2.2 K/UL (ref 1–5.1)
LYMPHOCYTES NFR BLD: 25.8 %
MAGNESIUM SERPL-MCNC: 2.27 MG/DL (ref 1.8–2.4)
MCH RBC QN AUTO: 31.9 PG (ref 26–34)
MCHC RBC AUTO-ENTMCNC: 35.2 G/DL (ref 31–36)
MCV RBC AUTO: 90.6 FL (ref 80–100)
METHADONE UR QL SCN>300 NG/ML: ABNORMAL
MONOCYTES # BLD: 0.8 K/UL (ref 0–1.3)
MONOCYTES NFR BLD: 8.8 %
NEUTROPHILS # BLD: 5.5 K/UL (ref 1.7–7.7)
NEUTROPHILS NFR BLD: 64 %
NITRITE UR QL STRIP.AUTO: NEGATIVE
OPIATES UR QL SCN>300 NG/ML: ABNORMAL
OXYCODONE UR QL SCN: ABNORMAL
PCP UR QL SCN>25 NG/ML: ABNORMAL
PH UR STRIP.AUTO: 7 [PH] (ref 5–8)
PH UR STRIP: 7 [PH]
PLATELET # BLD AUTO: 395 K/UL (ref 135–450)
PMV BLD AUTO: 8.6 FL (ref 5–10.5)
POTASSIUM SERPL-SCNC: 3.7 MMOL/L (ref 3.5–5.1)
PROT SERPL-MCNC: 7.5 G/DL (ref 6.4–8.2)
PROT UR STRIP.AUTO-MCNC: NEGATIVE MG/DL
RBC # BLD AUTO: 5.6 M/UL (ref 4.2–5.9)
RBC #/AREA URNS HPF: NORMAL /HPF (ref 0–4)
SODIUM SERPL-SCNC: 140 MMOL/L (ref 136–145)
SP GR UR STRIP.AUTO: 1.02 (ref 1–1.03)
TROPONIN, HIGH SENSITIVITY: 12 NG/L (ref 0–22)
TSH SERPL DL<=0.005 MIU/L-ACNC: 2.46 UIU/ML (ref 0.27–4.2)
UA COMPLETE W REFLEX CULTURE PNL UR: ABNORMAL
UA DIPSTICK W REFLEX MICRO PNL UR: YES
URN SPEC COLLECT METH UR: ABNORMAL
UROBILINOGEN UR STRIP-ACNC: 0.2 E.U./DL
WBC # BLD AUTO: 8.6 K/UL (ref 4–11)
WBC #/AREA URNS HPF: NORMAL /HPF (ref 0–5)

## 2025-01-07 PROCEDURE — 93005 ELECTROCARDIOGRAM TRACING: CPT | Performed by: INTERNAL MEDICINE

## 2025-01-07 PROCEDURE — 99223 1ST HOSP IP/OBS HIGH 75: CPT | Performed by: INTERNAL MEDICINE

## 2025-01-07 PROCEDURE — 93005 ELECTROCARDIOGRAM TRACING: CPT

## 2025-01-07 PROCEDURE — 2060000000 HC ICU INTERMEDIATE R&B

## 2025-01-07 PROCEDURE — 85025 COMPLETE CBC W/AUTO DIFF WBC: CPT

## 2025-01-07 PROCEDURE — B24BZZ4 ULTRASONOGRAPHY OF HEART WITH AORTA, TRANSESOPHAGEAL: ICD-10-PCS | Performed by: INTERNAL MEDICINE

## 2025-01-07 PROCEDURE — 93010 ELECTROCARDIOGRAM REPORT: CPT | Performed by: INTERNAL MEDICINE

## 2025-01-07 PROCEDURE — 6360000002 HC RX W HCPCS

## 2025-01-07 PROCEDURE — 96374 THER/PROPH/DIAG INJ IV PUSH: CPT

## 2025-01-07 PROCEDURE — 71045 X-RAY EXAM CHEST 1 VIEW: CPT

## 2025-01-07 PROCEDURE — 7100000010 HC PHASE II RECOVERY - FIRST 15 MIN: Performed by: INTERNAL MEDICINE

## 2025-01-07 PROCEDURE — 82077 ASSAY SPEC XCP UR&BREATH IA: CPT

## 2025-01-07 PROCEDURE — 96365 THER/PROPH/DIAG IV INF INIT: CPT

## 2025-01-07 PROCEDURE — 3700000001 HC ADD 15 MINUTES (ANESTHESIA): Performed by: INTERNAL MEDICINE

## 2025-01-07 PROCEDURE — 84443 ASSAY THYROID STIM HORMONE: CPT

## 2025-01-07 PROCEDURE — 3700000000 HC ANESTHESIA ATTENDED CARE: Performed by: INTERNAL MEDICINE

## 2025-01-07 PROCEDURE — 2500000003 HC RX 250 WO HCPCS

## 2025-01-07 PROCEDURE — 80307 DRUG TEST PRSMV CHEM ANLYZR: CPT

## 2025-01-07 PROCEDURE — 99285 EMERGENCY DEPT VISIT HI MDM: CPT

## 2025-01-07 PROCEDURE — 93312 ECHO TRANSESOPHAGEAL: CPT

## 2025-01-07 PROCEDURE — 5A2204Z RESTORATION OF CARDIAC RHYTHM, SINGLE: ICD-10-PCS | Performed by: INTERNAL MEDICINE

## 2025-01-07 PROCEDURE — 2580000003 HC RX 258: Performed by: INTERNAL MEDICINE

## 2025-01-07 PROCEDURE — 2580000003 HC RX 258

## 2025-01-07 PROCEDURE — 93005 ELECTROCARDIOGRAM TRACING: CPT | Performed by: EMERGENCY MEDICINE

## 2025-01-07 PROCEDURE — 6370000000 HC RX 637 (ALT 250 FOR IP)

## 2025-01-07 PROCEDURE — 96375 TX/PRO/DX INJ NEW DRUG ADDON: CPT

## 2025-01-07 PROCEDURE — 6370000000 HC RX 637 (ALT 250 FOR IP): Performed by: INTERNAL MEDICINE

## 2025-01-07 PROCEDURE — 2500000003 HC RX 250 WO HCPCS: Performed by: STUDENT IN AN ORGANIZED HEALTH CARE EDUCATION/TRAINING PROGRAM

## 2025-01-07 PROCEDURE — 80053 COMPREHEN METABOLIC PANEL: CPT

## 2025-01-07 PROCEDURE — 83735 ASSAY OF MAGNESIUM: CPT

## 2025-01-07 PROCEDURE — 7100000011 HC PHASE II RECOVERY - ADDTL 15 MIN: Performed by: INTERNAL MEDICINE

## 2025-01-07 PROCEDURE — 84484 ASSAY OF TROPONIN QUANT: CPT

## 2025-01-07 PROCEDURE — 81001 URINALYSIS AUTO W/SCOPE: CPT

## 2025-01-07 PROCEDURE — 36415 COLL VENOUS BLD VENIPUNCTURE: CPT

## 2025-01-07 RX ORDER — ACETAMINOPHEN 650 MG/1
650 SUPPOSITORY RECTAL EVERY 6 HOURS PRN
Status: DISCONTINUED | OUTPATIENT
Start: 2025-01-07 | End: 2025-01-08 | Stop reason: HOSPADM

## 2025-01-07 RX ORDER — METOPROLOL TARTRATE 25 MG/1
12.5 TABLET, FILM COATED ORAL 2 TIMES DAILY
Status: DISCONTINUED | OUTPATIENT
Start: 2025-01-07 | End: 2025-01-08

## 2025-01-07 RX ORDER — POTASSIUM CHLORIDE 1500 MG/1
40 TABLET, EXTENDED RELEASE ORAL PRN
Status: DISCONTINUED | OUTPATIENT
Start: 2025-01-07 | End: 2025-01-08

## 2025-01-07 RX ORDER — SODIUM CHLORIDE 9 MG/ML
INJECTION, SOLUTION INTRAVENOUS PRN
Status: DISCONTINUED | OUTPATIENT
Start: 2025-01-07 | End: 2025-01-08 | Stop reason: HOSPADM

## 2025-01-07 RX ORDER — POLYETHYLENE GLYCOL 3350 17 G/17G
17 POWDER, FOR SOLUTION ORAL DAILY PRN
Status: DISCONTINUED | OUTPATIENT
Start: 2025-01-07 | End: 2025-01-08 | Stop reason: HOSPADM

## 2025-01-07 RX ORDER — MAGNESIUM SULFATE IN WATER 40 MG/ML
2000 INJECTION, SOLUTION INTRAVENOUS PRN
Status: DISCONTINUED | OUTPATIENT
Start: 2025-01-07 | End: 2025-01-08

## 2025-01-07 RX ORDER — MIDAZOLAM HYDROCHLORIDE 1 MG/ML
INJECTION, SOLUTION INTRAMUSCULAR; INTRAVENOUS
Status: DISCONTINUED | OUTPATIENT
Start: 2025-01-07 | End: 2025-01-07 | Stop reason: SDUPTHER

## 2025-01-07 RX ORDER — SODIUM CHLORIDE 0.9 % (FLUSH) 0.9 %
5-40 SYRINGE (ML) INJECTION PRN
Status: DISCONTINUED | OUTPATIENT
Start: 2025-01-07 | End: 2025-01-10 | Stop reason: HOSPADM

## 2025-01-07 RX ORDER — SODIUM CHLORIDE 9 MG/ML
INJECTION, SOLUTION INTRAVENOUS PRN
Status: CANCELLED | OUTPATIENT
Start: 2025-01-07

## 2025-01-07 RX ORDER — PROPOFOL 10 MG/ML
INJECTION, EMULSION INTRAVENOUS
Status: DISCONTINUED | OUTPATIENT
Start: 2025-01-07 | End: 2025-01-07 | Stop reason: SDUPTHER

## 2025-01-07 RX ORDER — SODIUM CHLORIDE 9 MG/ML
INJECTION, SOLUTION INTRAVENOUS PRN
Status: DISCONTINUED | OUTPATIENT
Start: 2025-01-07 | End: 2025-01-10 | Stop reason: HOSPADM

## 2025-01-07 RX ORDER — SODIUM CHLORIDE 0.9 % (FLUSH) 0.9 %
5-40 SYRINGE (ML) INJECTION EVERY 12 HOURS SCHEDULED
Status: CANCELLED | OUTPATIENT
Start: 2025-01-07

## 2025-01-07 RX ORDER — LISINOPRIL 2.5 MG/1
2.5 TABLET ORAL DAILY
Status: DISCONTINUED | OUTPATIENT
Start: 2025-01-08 | End: 2025-01-08 | Stop reason: HOSPADM

## 2025-01-07 RX ORDER — ACETAMINOPHEN 325 MG/1
650 TABLET ORAL EVERY 6 HOURS PRN
Status: DISCONTINUED | OUTPATIENT
Start: 2025-01-07 | End: 2025-01-08 | Stop reason: HOSPADM

## 2025-01-07 RX ORDER — SODIUM CHLORIDE 0.9 % (FLUSH) 0.9 %
5-40 SYRINGE (ML) INJECTION PRN
Status: CANCELLED | OUTPATIENT
Start: 2025-01-07

## 2025-01-07 RX ORDER — ADENOSINE 3 MG/ML
INJECTION, SOLUTION INTRAVENOUS
Status: COMPLETED
Start: 2025-01-07 | End: 2025-01-07

## 2025-01-07 RX ORDER — ONDANSETRON 2 MG/ML
4 INJECTION INTRAMUSCULAR; INTRAVENOUS EVERY 6 HOURS PRN
Status: DISCONTINUED | OUTPATIENT
Start: 2025-01-07 | End: 2025-01-08 | Stop reason: HOSPADM

## 2025-01-07 RX ORDER — PHENYLEPHRINE HCL IN 0.9% NACL 1 MG/10 ML
SYRINGE (ML) INTRAVENOUS
Status: DISCONTINUED | OUTPATIENT
Start: 2025-01-07 | End: 2025-01-07 | Stop reason: SDUPTHER

## 2025-01-07 RX ORDER — LISINOPRIL 2.5 MG/1
2.5 TABLET ORAL DAILY
Status: DISCONTINUED | OUTPATIENT
Start: 2025-01-07 | End: 2025-01-07

## 2025-01-07 RX ORDER — SODIUM CHLORIDE 0.9 % (FLUSH) 0.9 %
5-40 SYRINGE (ML) INJECTION EVERY 12 HOURS SCHEDULED
Status: DISCONTINUED | OUTPATIENT
Start: 2025-01-07 | End: 2025-01-08 | Stop reason: HOSPADM

## 2025-01-07 RX ORDER — LIDOCAINE HYDROCHLORIDE 20 MG/ML
INJECTION, SOLUTION EPIDURAL; INFILTRATION; INTRACAUDAL; PERINEURAL
Status: DISCONTINUED | OUTPATIENT
Start: 2025-01-07 | End: 2025-01-07 | Stop reason: SDUPTHER

## 2025-01-07 RX ORDER — ADENOSINE 3 MG/ML
12 INJECTION, SOLUTION INTRAVENOUS ONCE
Status: DISCONTINUED | OUTPATIENT
Start: 2025-01-07 | End: 2025-01-07

## 2025-01-07 RX ORDER — ONDANSETRON 4 MG/1
4 TABLET, ORALLY DISINTEGRATING ORAL EVERY 8 HOURS PRN
Status: DISCONTINUED | OUTPATIENT
Start: 2025-01-07 | End: 2025-01-08 | Stop reason: HOSPADM

## 2025-01-07 RX ORDER — SODIUM CHLORIDE 0.9 % (FLUSH) 0.9 %
5-40 SYRINGE (ML) INJECTION PRN
Status: DISCONTINUED | OUTPATIENT
Start: 2025-01-07 | End: 2025-01-08 | Stop reason: HOSPADM

## 2025-01-07 RX ORDER — ADENOSINE 3 MG/ML
12 INJECTION, SOLUTION INTRAVENOUS ONCE
Status: COMPLETED | OUTPATIENT
Start: 2025-01-07 | End: 2025-01-07

## 2025-01-07 RX ORDER — SODIUM CHLORIDE 0.9 % (FLUSH) 0.9 %
5-40 SYRINGE (ML) INJECTION EVERY 12 HOURS SCHEDULED
Status: DISCONTINUED | OUTPATIENT
Start: 2025-01-07 | End: 2025-01-10 | Stop reason: HOSPADM

## 2025-01-07 RX ORDER — POTASSIUM CHLORIDE 7.45 MG/ML
10 INJECTION INTRAVENOUS PRN
Status: DISCONTINUED | OUTPATIENT
Start: 2025-01-07 | End: 2025-01-08

## 2025-01-07 RX ORDER — DILTIAZEM HYDROCHLORIDE 5 MG/ML
5 INJECTION INTRAVENOUS ONCE
Status: COMPLETED | OUTPATIENT
Start: 2025-01-07 | End: 2025-01-07

## 2025-01-07 RX ADMIN — ADENOSINE 6 MG: 3 INJECTION, SOLUTION INTRAVENOUS at 11:31

## 2025-01-07 RX ADMIN — PROPOFOL 30 MG: 10 INJECTION, EMULSION INTRAVENOUS at 14:30

## 2025-01-07 RX ADMIN — PROPOFOL 100 MG: 10 INJECTION, EMULSION INTRAVENOUS at 14:21

## 2025-01-07 RX ADMIN — MIDAZOLAM 2 MG: 1 INJECTION INTRAMUSCULAR; INTRAVENOUS at 14:21

## 2025-01-07 RX ADMIN — Medication 100 MCG: at 14:35

## 2025-01-07 RX ADMIN — SODIUM CHLORIDE: 9 INJECTION, SOLUTION INTRAVENOUS at 14:19

## 2025-01-07 RX ADMIN — PROPOFOL 40 MG: 10 INJECTION, EMULSION INTRAVENOUS at 14:28

## 2025-01-07 RX ADMIN — LIDOCAINE HYDROCHLORIDE 60 MG: 20 INJECTION, SOLUTION EPIDURAL; INFILTRATION; INTRACAUDAL; PERINEURAL at 14:21

## 2025-01-07 RX ADMIN — ADENOSINE 12 MG: 3 INJECTION, SOLUTION INTRAVENOUS at 11:35

## 2025-01-07 RX ADMIN — METOPROLOL TARTRATE 12.5 MG: 25 TABLET, FILM COATED ORAL at 20:17

## 2025-01-07 RX ADMIN — DILTIAZEM HYDROCHLORIDE 5 MG: 5 INJECTION, SOLUTION INTRAVENOUS at 11:45

## 2025-01-07 RX ADMIN — SODIUM CHLORIDE 5 MG/HR: 900 INJECTION, SOLUTION INTRAVENOUS at 12:14

## 2025-01-07 RX ADMIN — RIVAROXABAN 20 MG: 20 TABLET, FILM COATED ORAL at 13:27

## 2025-01-07 RX ADMIN — Medication 10 ML: at 20:17

## 2025-01-07 RX ADMIN — PROPOFOL 50 MG: 10 INJECTION, EMULSION INTRAVENOUS at 14:22

## 2025-01-07 RX ADMIN — PROPOFOL 50 MG: 10 INJECTION, EMULSION INTRAVENOUS at 14:25

## 2025-01-07 ASSESSMENT — PAIN - FUNCTIONAL ASSESSMENT
PAIN_FUNCTIONAL_ASSESSMENT: NONE - DENIES PAIN
PAIN_FUNCTIONAL_ASSESSMENT: NONE - DENIES PAIN
PAIN_FUNCTIONAL_ASSESSMENT: 0-10

## 2025-01-07 ASSESSMENT — PAIN SCALES - GENERAL: PAINLEVEL_OUTOF10: 0

## 2025-01-07 NOTE — PROCEDURES
PROCEDURE NOTE  Date: 1/7/2025   Name: Moose Davila  YOB: 1995    Procedures    1/7/2025    PROCEDURE PERFORMED:     1. A Complete Transesophageal echocardiogram with color and spectral doppler and 3-D imaging was performed.   2. Direct current cardioversion.    INDICATIONS: Symptomatic Atrial fibrillation.     PROCEDURE: The patient was brought to the lab in fasting state. The patient received adequate sedation with the assistance of anesthesia for the case. After ensuring adequate sedation, the esophagus was intubated and a complete transesophageal echocardiogram was completed. There were no complications related to the study. Following clearing the left atrial appendage/left atrium of thrombus, the patient was prepped for cardioversion. A synchronized shock delivering 200 Joules energy was given. The patient was successfully cardioverted to normal sinus rhythm.  The patient tolerated the procedure well. Post-procedure examination notable for stable vitals with hemodynamic and neurovascular recovery.    POST PROCEDURE EKG: Sinus tachycardia     CONCLUSION: Successful direct current cardioversion.     PLAN:   1. Continue systemic anticoagulation.   2. Patient has a very depressed EF- so would admit patient. TTE tomorrow   3. Patient will have to be admitted to start beta blocker and ACE I for his cardiomyopathy once BP improves from sedation-SBP currently in 80s so will not start medications yet.   4. Event monitor prior to discharge and follow up with as outpatient after a cardiac MRI in 3-4 months.   5. Discussed cutting back on alcohol. Patient agreeable to nicotine patches and attempting to quit smoking as well.     Natalia Sheikh DO   [facility]   [phone] [place] office   [phone] [facility]

## 2025-01-07 NOTE — TELEPHONE ENCOUNTER
Monitor company [employer]  Length of monitor 30 days  Monitor ordered by Natalia Sheikh DO    Patch ID [id number]  Device number [id number]  Monitor given to Matilde Yeager RN while the patient was in the cath lab.   Nurse to apply at the time of discharge.

## 2025-01-07 NOTE — ANESTHESIA POSTPROCEDURE EVALUATION
11:42 AM        GLUCOSE                  95                  01/07/2025 11:42 AM   [place]  No results found for: \"PROTIME\", \"INR\", \"APTT\"    Intake & Output:  In: 200 [I.V.:200]  Out: -     Nausea & Vomiting:  No    Level of Consciousness:  Awake    Pain Assessment:  Adequate analgesia    Anesthesia Complications:  No apparent anesthetic complications    SUMMARY      Vital signs stable  OK to discharge from Stage I post anesthesia care.  Care transferred from Anesthesiology department on discharge from perioperative area       No notable events documented.

## 2025-01-07 NOTE — H&P
CARDIAC ELECTROPHYSIOLOGY CONSULT NOTE  25  Patient Name: Moose Davila  YOB: 1995    Primary Care Physician: Otis Soto APRN - NADINE     Moose Davila was seen today on 2025 in consultation due to chief complaint of atrial fibrillation .    Patient is a 29 y.o. male with PMH of ASD, sinus tachycardia, palpitations, anxiety, depression, who was seen today for atrial fibrillation.    Patient reports he started having palpitations yesterday.  He presented to the emergency room with heart rate of 200 bpm.  Blood pressure was stable.  In the emergency room, vagal maneuver decrease the heart rate to 150s transiently.  He also received adenosine with no change in the arrhythmia in the emergency room.  Patient is on a diltiazem drip with heart rate now in the 120s.  Lab work does not show any significant abnormalities except for mildly transaminitis.    EKGs upon presentation and today are as follows        He reports he has had palpitations intermittently.  He is palpitations have never been during the times he has been seen in the diagnosis of atrial fibrillation has never been made.  He had a 72 hour Holter monitor done at [facility] that did not show any atrial fibrillation in 2019.    He believes he has a family history of atrial fibrillation as well.  His mother and his maternal side of the family has a history of heart failure.  His mother  last year and she had a LVAD.     ALLERGIES:   Allergies   Allergen Reactions    Amoxicillin Rash        MEDICATIONS:   Current Facility-Administered Medications   Medication Dose Route Frequency Provider Last Rate Last Admin    sodium chloride flush 0.9 % injection 5-40 mL  5-40 mL IntraVENous 2 times per day Karim, Natalia, DO        sodium chloride flush 0.9 % injection 5-40 mL  5-40 mL IntraVENous PRN Karim, Natalia, DO        0.9 % sodium chloride infusion   IntraVENous PRN Karim, Natalia, DO         Current Outpatient

## 2025-01-07 NOTE — CONSULTS
CARDIAC ELECTROPHYSIOLOGY CONSULT NOTE  25  Patient Name: Moose Davila  YOB: 1995    Primary Care Physician: Otis Soto APRN - NADINE     Moose Davila was seen today on 2025 in consultation due to chief complaint of atrial fibrillation .    Patient is a 29 y.o. male with PMH of ASD, sinus tachycardia, palpitations, anxiety, depression, who was seen today for atrial fibrillation.    Patient reports he started having palpitations yesterday.  He presented to the emergency room with heart rate of 200 bpm.  Blood pressure was stable.  In the emergency room, vagal maneuver decrease the heart rate to 150s transiently.  He also received adenosine with no change in the arrhythmia in the emergency room.  Patient is on a diltiazem drip with heart rate now in the 120s.  Lab work does not show any significant abnormalities except for mildly transaminitis.    EKGs upon presentation and today are as follows        He reports he has had palpitations intermittently.  He is palpitations have never been during the times he has been seen in the diagnosis of atrial fibrillation has never been made.  He had a 72 hour Holter monitor done at [facility] that did not show any atrial fibrillation in 2019.    He believes he has a family history of atrial fibrillation as well.  His mother and his maternal side of the family has a history of heart failure.  His mother  last year and she had a LVAD.     ALLERGIES:   Allergies   Allergen Reactions    Amoxicillin Rash        MEDICATIONS:   Current Facility-Administered Medications   Medication Dose Route Frequency Provider Last Rate Last Admin    dilTIAZem 100 mg in sodium chloride 0.9 % 100 mL infusion (ADD-[place])  2.5-15 mg/hr IntraVENous Continuous Artemio Sotelo PA-C         Current Outpatient Medications   Medication Sig Dispense Refill    ARIPiprazole (ABILIFY) 5 MG tablet Take 1 tablet by mouth daily 30 tablet 11    citalopram

## 2025-01-07 NOTE — H&P
[facility] Medicine History & Physical    V 1.6    Date of Admission: 1/7/2025    Date of Service:  Pt seen/examined on 01/07/25     [x]Admitted to Inpatient with expected LOS greater than two midnights due to medical therapy.  []Placed in Observation status.    Chief Admission Complaint:  tachycardia    Presenting Admission History:      29 y.o. male who presented to [facility] with tachycardia.  PMHx significant for anxiety.      Patient states he came to the emergency department for atrial fibrillation which started at 6 PM yesterday.  Patient was shoveling snow and began to have a headache.  After patient tried to relax but was unable to due to palpitations and chest pressure.  Symptoms continued into the a.m. when patient went to urgent care.  Patient had symptoms of dizziness, tingling, shortness of breath at that time.  At urgent care they did get an EKG and informed him that he needed to go to the emergency department for further evaluation and treatment.  Patient has had prior episodes of atrial fibrillation.  Patient denies syncope, cough, nausea, vomiting, changes in bowel or bladder habits.    Assessment/Plan:      Current Principal Problem:  Atrial fibrillation with RVR (HCC)    Atrial Fibrillation with RVR - paroxysmal, of unspecified and clinically unable to determine etiology.   -Cardiology consulted, performed SARA cardioversion      Discussed management and the need for Hospitalization of the patient w/ the Emergency Department Provider: Artemio Sotelo    CXR: I have reviewed the CXR with the following interpretation: no acute issues   EKG:  I have reviewed the EKG with the following interpretation: A Fib with RVR with a rate of 192    Physical Exam Performed:      BP 95/63   Pulse (!) 118   Temp 98.9 °F (37.2 °C) (Oral)   Resp 16   Ht 1.676 m (5' 6\")   Wt 89.7 kg (197 lb 11.2 oz)   SpO2 100%   BMI 31.91 kg/m²     General appearance:  No apparent distress, appears stated age

## 2025-01-07 NOTE — ED PROVIDER NOTES
Emergency Department Attending Provider Note  Location: [facility]  ED  1/7/2025     Patient Identification  Moose Davila is a 29 y.o. male      Moose Davila was evaluated in the Emergency Department for palpitations.     HPI: as noted above    Physical Exam: Anxious, tachycardic rates as high as 200s, speaking full sentences.  No diaphoresis.      EKG Interpretation  Rhythm is narrow complex tachycardic rhythm, irregular?  Rate is 180  Axis is normal  No STEMI criteria  Qtc is normal    Bedside Ultrasound  No results found.     Patient seen and evaluated.  Relevant records reviewed.  Patient presents with symptoms noted above.  Patient presents with symptomatic but overall fairly stable narrow complex tachycardic rhythm.  Given adenosine and did not convert but slow down and appears to be consistent with A-fib with RVR.  Started on diltiazem infusion.  Not consistent with PE.  No history of thyroid disease.  May benefit from further cardiac workup, echo, cardioversion.  Plan for admission    Although initial history and physical exam information was obtained by HAI/NPP/MD/DO (who also dictated a record of this visit), I personally saw the patient and made/approved the management plan and take responsibility for patient management. I, Dr. Ashby, am the primary clinician of record.     I personally saw the patient and independently provided 35 minutes of non-concurrent critical care out of the total shared critical care time excluding separately billed procedures.    This chart was generated in part by using Dragon Dictation system and may contain errors related to that system including errors in grammar, punctuation, and spelling, as well as words and phrases that may be inappropriate. If there are any questions or concerns please feel free to contact the dictating provider for clarification.     Alexandr Ashby MD   [employer]        Alexandr Ashby MD  01/07/25 1726    
within normal range or not returned as of this dictation.     RADIOLOGY  Non-plain film images such as CT, U/S, and MRI are read by the radiologist.  Plain radiographic images are visualized and preliminarily interpreted by the ED Provider with the below findings:     Interpretation per the Radiologist below, if available at the time of this note:  XR CHEST PORTABLE   Final Result   Impression:   1. No airspace disease.      Electronically signed by Benitez Veloz MD        PROCEDURES  Unless otherwise noted below, none.    ED COURSE/DDx/MDM  Vitals:  Vitals:    01/07/25 1211 01/07/25 1303 01/07/25 1311 01/07/25 1322   BP: 116/80 (!) 86/57 95/63    Pulse: (!) 126 (!) 115 (!) 118    Resp: 18 14 16    Temp:       TempSrc:       SpO2: 100% 98% 100%    Weight:    89.7 kg (197 lb 11.2 oz)   Height:    1.676 m (5' 6\")     Patient received following medications in ED:  Medications   dilTIAZem 100 mg in sodium chloride 0.9 % 100 mL infusion (ADD-[place]) (has no administration in time range)   sodium chloride flush 0.9 % injection 5-40 mL (has no administration in time range)   sodium chloride flush 0.9 % injection 5-40 mL (has no administration in time range)   0.9 % sodium chloride infusion (has no administration in time range)   potassium chloride (KLOR-CON M) extended release tablet 40 mEq (has no administration in time range)     Or   potassium bicarb-citric acid (EFFER-K) effervescent tablet 40 mEq (has no administration in time range)     Or   potassium chloride 10 mEq/100 mL IVPB (Peripheral Line) (has no administration in time range)   magnesium sulfate 2000 mg in 50 mL IVPB premix (has no administration in time range)   ondansetron (ZOFRAN-ODT) disintegrating tablet 4 mg (has no administration in time range)     Or   ondansetron (ZOFRAN) injection 4 mg (has no administration in time range)   polyethylene glycol (GLYCOLAX) packet 17 g (has no administration in time range)   acetaminophen (TYLENOL) tablet 650 mg

## 2025-01-07 NOTE — ANESTHESIA PRE PROCEDURE
Department of Anesthesiology  Preprocedure Note       Name:  Moose Davila   Age:  29 y.o.  :  1995                                          MRN:  [id number]         Date:  2025      Surgeon: * No surgeons listed *    Procedure: * No procedures listed *    Medications prior to admission:   Prior to Admission medications    Medication Sig Start Date End Date Taking? Authorizing Provider   ARIPiprazole (ABILIFY) 5 MG tablet Take 1 tablet by mouth daily 18   Colten Hammond MD   citalopram (CELEXA) 20 MG tablet Take 1 tablet by mouth daily 18   Colten Hammond MD       Current medications:    Current Facility-Administered Medications   Medication Dose Route Frequency Provider Last Rate Last Admin   • sodium chloride flush 0.9 % injection 5-40 mL  5-40 mL IntraVENous 2 times per day Natalia Sheikh,        • sodium chloride flush 0.9 % injection 5-40 mL  5-40 mL IntraVENous PRN Sa Aguilarima, DO       • 0.9 % sodium chloride infusion   IntraVENous PRN Natalia Sheikh,          Current Outpatient Medications   Medication Sig Dispense Refill   • ARIPiprazole (ABILIFY) 5 MG tablet Take 1 tablet by mouth daily 30 tablet 11   • citalopram (CELEXA) 20 MG tablet Take 1 tablet by mouth daily 30 tablet 5     Facility-Administered Medications Ordered in Other Encounters   Medication Dose Route Frequency Provider Last Rate Last Admin   • dilTIAZem 100 mg in sodium chloride 0.9 % 100 mL infusion (ADD-[place])  2.5-15 mg/hr IntraVENous Continuous Artemio Sotelo, PAAlexanderC           Allergies:    Allergies   Allergen Reactions   • Amoxicillin Rash       Problem List:    Patient Active Problem List   Diagnosis Code   • Anxiety F41.9   • Encounter to establish care Z76.89   • Cardiac arrhythmia I49.9   • Moderate episode of recurrent major depressive disorder (HCC) F33.1   • Social anxiety disorder F40.10   • Eustachian tube dysfunction H69.90   • Atrial fibrillation (HCC) I48.91       Past Medical History:

## 2025-01-07 NOTE — ED NOTES
443 @ [facility]  
Decreased diltiazem to 2 mg, patients blood pressure 86/57.  
Moose Davila is a 29 y.o. male admitted for  Principal Problem:    Atrial fibrillation with RVR (HCC)  Active Problems:    Atrial fibrillation (HCC)  Resolved Problems:    * No resolved hospital problems. *  .   Patient Home via self with   Chief Complaint   Patient presents with    Palpitations     Started at 6pm last night, was shoveling snow when started, having headaches also, sent from urgent care,  has seen a cardiologist before,    .  Patient is alert and Person, Place, Time, and Situation  Patient's baseline mobility: Baseline Mobility: Independent   Code Status: Full Code   Cardiac Rhythm:       Is patient on baseline Oxygen: no how many Liters:   Abnormal Assessment Findings: EF 25-30%, patient determined by EKG A-fib RVR.    Isolation: None      NIH Score:    C-SSRS: Risk of Suicide: No Risk  Bedside swallow:        Active LDA's:   Peripheral IV 01/07/25 Left Antecubital (Active)   Site Assessment Clean, dry & intact 01/07/25 1139   Line Status Blood return noted;Brisk blood return;Flushed;Normal saline locked;Specimen collected 01/07/25 1139   Phlebitis Assessment No symptoms 01/07/25 1139   Infiltration Assessment 0 01/07/25 1139   Dressing Status New dressing applied;Clean, dry & intact 01/07/25 1139   Dressing Type Transparent 01/07/25 1139   Dressing Intervention New 01/07/25 1139       Peripheral IV 01/07/25 Right Antecubital (Active)   Site Assessment Clean, dry & intact 01/07/25 1140   Line Status Blood return noted;Brisk blood return;Flushed;Normal saline locked;Specimen collected 01/07/25 1140   Phlebitis Assessment No symptoms 01/07/25 1140   Infiltration Assessment 0 01/07/25 1140   Dressing Status New dressing applied;Clean, dry & intact 01/07/25 1140   Dressing Type Transparent 01/07/25 1140   Dressing Intervention New 01/07/25 1140     Patient admitted with a ceja: no If the ceja is chronic was it exchanged:No  Reason for ceja:   Patient admitted with Central Line:  . PICC line placement 
Paged A Cardiology @3150, Artemio Joel PA-C  RE: tachycardia  @1240 Made second phone call  @1715 EP called back and spoke to Artemio GOFF   
Report from Cath Lab nurse  -270 propofol   -Cardiovert 200- Sinus Tach  -Cards- EF 25-30%   -30 Day heart Monitor in Patients Josselin velez will do it once he is discharged.     
Stopped diltiazem per Artemio GOFF, ordered to start normal saline fluids. Fluids started.  
Vagal maneuver attempted, pulse decreased to 151 and then back up to 213  
Verbal order given to give 12 mg of adenosine.  
no

## 2025-01-08 ENCOUNTER — APPOINTMENT (OUTPATIENT)
Age: 30
DRG: 309 | End: 2025-01-08
Payer: COMMERCIAL

## 2025-01-08 VITALS
BODY MASS INDEX: 31.68 KG/M2 | TEMPERATURE: 97.9 F | HEIGHT: 66 IN | RESPIRATION RATE: 16 BRPM | HEART RATE: 78 BPM | OXYGEN SATURATION: 99 % | SYSTOLIC BLOOD PRESSURE: 111 MMHG | DIASTOLIC BLOOD PRESSURE: 71 MMHG | WEIGHT: 197.1 LBS

## 2025-01-08 PROBLEM — I42.9 FAMILIAL CARDIOMYOPATHY (HCC): Status: ACTIVE | Noted: 2025-01-08

## 2025-01-08 PROBLEM — I42.9 CARDIOMYOPATHY (HCC): Status: ACTIVE | Noted: 2025-01-08

## 2025-01-08 LAB
ANION GAP SERPL CALCULATED.3IONS-SCNC: 12 MMOL/L (ref 3–16)
BASOPHILS # BLD: 0.1 K/UL (ref 0–0.2)
BASOPHILS NFR BLD: 0.6 %
BUN SERPL-MCNC: 9 MG/DL (ref 7–20)
CALCIUM SERPL-MCNC: 9 MG/DL (ref 8.3–10.6)
CHLORIDE SERPL-SCNC: 106 MMOL/L (ref 99–110)
CO2 SERPL-SCNC: 22 MMOL/L (ref 21–32)
CREAT SERPL-MCNC: 1 MG/DL (ref 0.9–1.3)
DEPRECATED RDW RBC AUTO: 13.2 % (ref 12.4–15.4)
ECHO AO ARCH DIAM: 2.5 CM
ECHO AO ASC DIAM: 2.8 CM
ECHO AO ASCENDING AORTA INDEX: 1.41 CM/M2
ECHO AO DESC DIAM: 1.7 CM
ECHO AO DESCENDING AORTA INDEX: 0.85 CM/M2
ECHO AO ROOT DIAM: 3.3 CM
ECHO AO ROOT INDEX: 1.66 CM/M2
ECHO AV AREA PEAK VELOCITY: 3.7 CM2
ECHO AV AREA/BSA PEAK VELOCITY: 1.9 CM2/M2
ECHO AV CUSP MM: 2.2 CM
ECHO AV PEAK GRADIENT: 7 MMHG
ECHO AV PEAK GRADIENT: 7 MMHG
ECHO AV PEAK VELOCITY: 1.3 M/S
ECHO AV VELOCITY RATIO: 0.85
ECHO BSA: 2.04 M2
ECHO BSA: 2.04 M2
ECHO EST RA PRESSURE: 8 MMHG
ECHO LA AREA 2C: 18.2 CM2
ECHO LA AREA 4C: 19.3 CM2
ECHO LA DIAMETER INDEX: 1.86 CM/M2
ECHO LA DIAMETER: 3.7 CM
ECHO LA MAJOR AXIS: 5.6 CM
ECHO LA MINOR AXIS: 6 CM
ECHO LA TO AORTIC ROOT RATIO: 1.12
ECHO LA VOL BP: 50 ML (ref 18–58)
ECHO LA VOL MOD A2C: 45 ML (ref 18–58)
ECHO LA VOL MOD A4C: 52 ML (ref 18–58)
ECHO LA VOL/BSA BIPLANE: 25 ML/M2 (ref 16–34)
ECHO LA VOLUME INDEX MOD A2C: 23 ML/M2 (ref 16–34)
ECHO LA VOLUME INDEX MOD A4C: 26 ML/M2 (ref 16–34)
ECHO LV E' LATERAL VELOCITY: 18.9 CM/S
ECHO LV E' SEPTAL VELOCITY: 12.3 CM/S
ECHO LV EDV 3D: 179 ML
ECHO LV EDV A2C: 121 ML
ECHO LV EDV A2C: 179 ML
ECHO LV EDV A4C: 119 ML
ECHO LV EDV A4C: 195 ML
ECHO LV EDV BP: 189 ML (ref 67–155)
ECHO LV EDV INDEX 3D: 90 ML/M2
ECHO LV EDV INDEX A4C: 60 ML/M2
ECHO LV EDV INDEX A4C: 98 ML/M2
ECHO LV EDV INDEX BP: 95 ML/M2
ECHO LV EDV NDEX A2C: 61 ML/M2
ECHO LV EDV NDEX A2C: 90 ML/M2
ECHO LV EJECTION FRACTION 3D: 41 %
ECHO LV EJECTION FRACTION A2C: 22 %
ECHO LV EJECTION FRACTION A2C: 48 %
ECHO LV EJECTION FRACTION A4C: 22 %
ECHO LV EJECTION FRACTION A4C: 50 %
ECHO LV EJECTION FRACTION BIPLANE: 22 % (ref 55–100)
ECHO LV EJECTION FRACTION BIPLANE: 50 % (ref 55–100)
ECHO LV ESV 3D: 106 ML
ECHO LV ESV A2C: 93 ML
ECHO LV ESV A2C: 94 ML
ECHO LV ESV A4C: 93 ML
ECHO LV ESV A4C: 97 ML
ECHO LV ESV BP: 95 ML (ref 22–58)
ECHO LV ESV INDEX 3D: 53 ML/M2
ECHO LV ESV INDEX A2C: 47 ML/M2
ECHO LV ESV INDEX A2C: 47 ML/M2
ECHO LV ESV INDEX A4C: 47 ML/M2
ECHO LV ESV INDEX A4C: 49 ML/M2
ECHO LV ESV INDEX BP: 48 ML/M2
ECHO LV FRACTIONAL SHORTENING: 20 % (ref 28–44)
ECHO LV GLOBAL LONGITUDINAL STRAIN (GLS): -12.6 %
ECHO LV GLOBAL LONGITUDINAL STRAIN (GLS): -13.6 %
ECHO LV GLOBAL LONGITUDINAL STRAIN (GLS): -13.6 %
ECHO LV GLOBAL LONGITUDINAL STRAIN (GLS): -14.5 %
ECHO LV INTERNAL DIMENSION DIASTOLE INDEX: 2.81 CM/M2
ECHO LV INTERNAL DIMENSION DIASTOLIC: 5.6 CM (ref 4.2–5.9)
ECHO LV INTERNAL DIMENSION SYSTOLIC INDEX: 2.26 CM/M2
ECHO LV INTERNAL DIMENSION SYSTOLIC: 4.5 CM
ECHO LV ISOVOLUMETRIC RELAXATION TIME (IVRT): 90 MS
ECHO LV IVSD: 0.8 CM (ref 0.6–1)
ECHO LV MASS 2D: 191.6 G (ref 88–224)
ECHO LV MASS 3D INDEX: 72.9 G/M2
ECHO LV MASS 3D: 145 G
ECHO LV MASS INDEX 2D: 96.3 G/M2 (ref 49–115)
ECHO LV POSTERIOR WALL DIASTOLIC: 1 CM (ref 0.6–1)
ECHO LV RELATIVE WALL THICKNESS RATIO: 0.36
ECHO LVOT AREA: 4.5 CM2
ECHO LVOT DIAM: 2.4 CM
ECHO LVOT MEAN GRADIENT: 3 MMHG
ECHO LVOT PEAK GRADIENT: 5 MMHG
ECHO LVOT PEAK VELOCITY: 1.1 M/S
ECHO LVOT STROKE VOLUME INDEX: 41.4 ML/M2
ECHO LVOT SV: 82.3 ML
ECHO LVOT VTI: 18.2 CM
ECHO MV A VELOCITY: 0.6 M/S
ECHO MV E DECELERATION TIME (DT): 180 MS
ECHO MV E VELOCITY: 0.66 M/S
ECHO MV E/A RATIO: 1.1
ECHO MV E/E' LATERAL: 3.49
ECHO MV E/E' RATIO (AVERAGED): 4.43
ECHO MV E/E' SEPTAL: 5.37
ECHO PULMONARY ARTERY END DIASTOLIC PRESSURE: 3 MMHG
ECHO PV REGURGITANT END DIASTOLIC MAX VELOCITY: 0.8 M/S
ECHO RA AREA 4C: 15.1 CM2
ECHO RA END SYSTOLIC VOLUME APICAL 4 CHAMBER INDEX BSA: 17 ML/M2
ECHO RA VOLUME: 34 ML
ECHO RV BASAL DIMENSION: 3.8 CM
ECHO RV EJECTION FRACTION 3D: 45 %
ECHO RV FRACTIONAL AREA CHANGE: 42 %
ECHO RV FREE WALL PEAK S': 19.5 CM/S
ECHO RV INTERNAL DIMENSION: 3.4 CM
ECHO RV LONGITUDINAL DIMENSION: 8.7 CM
ECHO RV MID DIMENSION: 3.6 CM
ECHO RV TAPSE: 2.7 CM (ref 1.7–?)
EOSINOPHIL # BLD: 0.1 K/UL (ref 0–0.6)
EOSINOPHIL NFR BLD: 1.4 %
GFR SERPLBLD CREATININE-BSD FMLA CKD-EPI: >90 ML/MIN/{1.73_M2}
GLUCOSE SERPL-MCNC: 82 MG/DL (ref 70–99)
HCT VFR BLD AUTO: 43.6 % (ref 40.5–52.5)
HGB BLD-MCNC: 15.1 G/DL (ref 13.5–17.5)
LYMPHOCYTES # BLD: 2.3 K/UL (ref 1–5.1)
LYMPHOCYTES NFR BLD: 26.3 %
MCH RBC QN AUTO: 32.1 PG (ref 26–34)
MCHC RBC AUTO-ENTMCNC: 34.7 G/DL (ref 31–36)
MCV RBC AUTO: 92.5 FL (ref 80–100)
MONOCYTES # BLD: 0.6 K/UL (ref 0–1.3)
MONOCYTES NFR BLD: 6.3 %
NEUTROPHILS # BLD: 5.7 K/UL (ref 1.7–7.7)
NEUTROPHILS NFR BLD: 65.4 %
NT-PROBNP SERPL-MCNC: 321 PG/ML (ref 0–124)
PLATELET # BLD AUTO: 311 K/UL (ref 135–450)
PMV BLD AUTO: 8.4 FL (ref 5–10.5)
POTASSIUM SERPL-SCNC: 3.8 MMOL/L (ref 3.5–5.1)
RBC # BLD AUTO: 4.71 M/UL (ref 4.2–5.9)
SODIUM SERPL-SCNC: 140 MMOL/L (ref 136–145)
WBC # BLD AUTO: 8.7 K/UL (ref 4–11)

## 2025-01-08 PROCEDURE — 6370000000 HC RX 637 (ALT 250 FOR IP): Performed by: INTERNAL MEDICINE

## 2025-01-08 PROCEDURE — 93356 MYOCRD STRAIN IMG SPCKL TRCK: CPT | Performed by: INTERNAL MEDICINE

## 2025-01-08 PROCEDURE — 36415 COLL VENOUS BLD VENIPUNCTURE: CPT

## 2025-01-08 PROCEDURE — 99233 SBSQ HOSP IP/OBS HIGH 50: CPT | Performed by: NURSE PRACTITIONER

## 2025-01-08 PROCEDURE — 76376 3D RENDER W/INTRP POSTPROCES: CPT | Performed by: INTERNAL MEDICINE

## 2025-01-08 PROCEDURE — 80048 BASIC METABOLIC PNL TOTAL CA: CPT

## 2025-01-08 PROCEDURE — 2500000003 HC RX 250 WO HCPCS: Performed by: STUDENT IN AN ORGANIZED HEALTH CARE EDUCATION/TRAINING PROGRAM

## 2025-01-08 PROCEDURE — 6370000000 HC RX 637 (ALT 250 FOR IP)

## 2025-01-08 PROCEDURE — 93270 REMOTE 30 DAY ECG REV/REPORT: CPT | Performed by: INTERNAL MEDICINE

## 2025-01-08 PROCEDURE — 83880 ASSAY OF NATRIURETIC PEPTIDE: CPT

## 2025-01-08 PROCEDURE — 93306 TTE W/DOPPLER COMPLETE: CPT | Performed by: INTERNAL MEDICINE

## 2025-01-08 PROCEDURE — 99233 SBSQ HOSP IP/OBS HIGH 50: CPT

## 2025-01-08 PROCEDURE — 93356 MYOCRD STRAIN IMG SPCKL TRCK: CPT

## 2025-01-08 PROCEDURE — 85025 COMPLETE CBC W/AUTO DIFF WBC: CPT

## 2025-01-08 RX ORDER — METOPROLOL SUCCINATE 25 MG/1
25 TABLET, EXTENDED RELEASE ORAL NIGHTLY
Qty: 30 TABLET | Refills: 3 | Status: SHIPPED | OUTPATIENT
Start: 2025-01-08

## 2025-01-08 RX ADMIN — Medication 10 ML: at 08:28

## 2025-01-08 RX ADMIN — METOPROLOL TARTRATE 12.5 MG: 25 TABLET, FILM COATED ORAL at 08:28

## 2025-01-08 RX ADMIN — LISINOPRIL 2.5 MG: 2.5 TABLET ORAL at 10:25

## 2025-01-08 RX ADMIN — RIVAROXABAN 20 MG: 20 TABLET, FILM COATED ORAL at 17:12

## 2025-01-08 NOTE — PLAN OF CARE
Echo reviewed.   Okay for discharge from cardiology perspective    Vee Vieyra CNP, 1/8/2025, 4:52 PM

## 2025-01-08 NOTE — PROGRESS NOTES
[facility] Medicine Progress Note  V 1.6      Date of Admission: 1/7/2025    Hospital Day: 2      Chief Admission Complaint:  tachycardia     Subjective:  no new c/o    Presenting Admission History:         \"29 y.o. male who presented to [facility] with tachycardia.  PMHx significant for anxiety.       Patient states he came to the emergency department for atrial fibrillation which started at 6 PM yesterday.  Patient was shoveling snow and began to have a headache.  After patient tried to relax but was unable to due to palpitations and chest pressure.  Symptoms continued into the a.m. when patient went to urgent care.  Patient had symptoms of dizziness, tingling, shortness of breath at that time.  At urgent care they did get an EKG and informed him that he needed to go to the emergency department for further evaluation and treatment.  Patient has had prior episodes of atrial fibrillation.  Patient denies syncope, cough, nausea, vomiting, changes in bowel or bladder habits\".       Assessment/Plan:      Current Principal Problem:  Atrial fibrillation with RVR (HCC)      Atrial Fibrillation with RVR - paroxysmal, of unspecified and clinically unable to determine etiology. Cardiology consulted s/p SARA/DCCV.  Echo ordered and pending, to f/u oupt for medical mgt and ischemic w/up.  Patient w/ hypercoagulable state secondary to Atrial Fibrillation started on Xarelto/Entresto/BBlocker .       Obesity - With Body mass index is 31.81 kg/m².       [x] Class I - 30.0 to 34.9 kg/m2  [] Class II - 35.0 to 39.9 kg/m2  [] Class III - >=40 kg/m2 (AKA severe/morbid/massive)   [] Super Obesity - > 50 kg/m2  [] Super Super Obesity - > 60 kg/m2    Complicating assessment and treatment. Placing patient at risk for multiple co-morbidities as well as early death and contributing to the patient's presentation.        Ongoing threat to life and/or bodily function without ongoing treatment due to:  New Onset 
Patient IV's and tele monitor removed for discharge. CMU notified. Patient 30 day event monitor placed and education was provided. No further questions. Patient will be returning tomorrow to  xarelto medication from outpatient pharmacy. Transported to front lobby in wheelchair. Picked up by his friend.  
at follow up  Discussed alcohol abstinence  Outpatient ischemic evaluation    Outpatient cardiac MRI in 3-4 months     Planning discharge later today once transthoracic complete echo completed.     Core measures for HFrEF (EF <40%):  EF: 25%   ICD: NA needs GDMT 1st  ACEi/ARB/ARNI: entresto  BB:toprol  Tomas: to start pending labs  SGLT2: to start pending b/p at follow up visit.     Time Based Itemization  A total of 50 minutes was spent on today's patient encounter.  If applicable, non-patient-facing activities:  (x )Preparing to see the patient and reviewing records  ( x) Individual interpretation of results  ( x) Discussion or coordination of care with other health care professionals  ( x) Ordering of unique tests, medications, or procedures  ( x) Documentation within the EHR      Education provided today Disease process and medications discussed. Questions answered fully.  Encouraged daily monitoring of the patient's weight.  Time spent educating today 35 minutes     RAMANA Wasserman - CNP,  1/8/2025, 12:58 PM    
   HGB 15.1 01/08/2025 06:01 AM    HCT 43.6 01/08/2025 06:01 AM    MCV 92.5 01/08/2025 06:01 AM    RDW 13.2 01/08/2025 06:01 AM     01/08/2025 06:01 AM     BNP:  No results found for: \"BNP\"  Fasting Lipid Panel:  No results found for: \"CHOL\", \"HDL\", \"TRIG\"  Cardiac Enzymes:  CK/MbTroponinNo results found for: \"CKTOTAL\", \"CKMB\", \"CKMBINDEX\", \"TROPONINI\"  PT/ INR No results found for: \"INR\", \"PROTIME\"  PTT No components found for: \"PTT\"   Lab Results   Component Value Date/Time    MG 2.27 01/07/2025 11:42 AM      Lab Results   Component Value Date/Time    TSH 1.27 09/18/2017 10:15 AM       Assessment:  Paroxysmal atrial fibrillation   -[id number] score reduced LVEF  Cardiomyopathy-unknown etiology-suspect possible tachycardia mediated versus EtOH versus familial cardiomyopathy  History of inappropriate sinus tachycardia  Family history congestive heart failure--- mother and grandmother    Plan:   Stop lisinopril--- with plan to start Entresto 1/10/2025 after washout  Discontinue metoprolol to tartrate and start Toprol XL 25 mg daily  Start Xarelto 20 mg daily--- status post DCCV 1/7/2025  Consider adding Jardiance and spironolactone--- follow-up with Vee Vieyra CNP in office  Abstain from alcohol  Will need outpatient cardiac MRI in 3 to 4 months before follow-up with Dr. Sheikh  Will need outpatient ischemic workup  Consult Vee Vieyra CNP today regarding severely reduced LVEF  Transthoracic complete echocardiogram today--okay for discharge after echocardiogram has been completed  Will need 30-day cardiac ambulatory monitor at discharge  Discussed plan of care at length with patient  Also discussed case and plan with Vee Vieyra CNP for heart failure    Time spent 50 minutes on plan and care    Eboni BOLES-CNP  [facility]  [phone]        
maximum assist (25% patients effort)

## 2025-01-08 NOTE — CARE COORDINATION
Case Management Assessment  Initial Evaluation    Date/Time of Evaluation: 1/8/2025 10:35 AM  Assessment Completed by: Shonda Juarez RN    If patient is discharged prior to next notation, then this note serves as note for discharge by case management.    Patient Name: Moose Davila                   YOB: 1995  Diagnosis: Atrial fibrillation with rapid ventricular response (HCC) [I48.91]  Atrial fibrillation with RVR (HCC) [I48.91]  Atrial fibrillation, unspecified type (HCC) [I48.91]                   Date / Time: 1/7/2025 11:15 AM    Patient Admission Status: Inpatient   Readmission Risk (Low < 19, Mod (19-27), High > 27): Readmission Risk Score: 2.9    Current PCP: Otis Soto APRN - CNP  PCP verified by CM? Yes    Chart Reviewed: Yes      History Provided by: Patient  Patient Orientation: Alert and Oriented    Patient Cognition: Alert    Hospitalization in the last 30 days (Readmission):  No    If yes, Readmission Assessment in CM Navigator will be completed.    Advance Directives:      Code Status: Full Code   Patient's Primary Decision Maker is: Patient Declined (Legal Next of Kin Remains as Decision Maker)    Primary Decision Maker: Sal Davila - Parent - [phone]    Discharge Planning:    Patient lives with: Alone Type of Home: House  Primary Care Giver: Self  Patient Support Systems include: Parent, Family Members   Current Financial resources: Other (Comment) ([facility] commercial)  Current community resources: None  Current services prior to admission: None            Current DME:              Type of Home Care services:  None    ADLS  Prior functional level: Independent in ADLs/IADLs  Current functional level: Independent in ADLs/IADLs    PT AM-PAC:   /24  OT AM-PAC:   /24    Family can provide assistance at DC:    Would you like Case Management to discuss the discharge plan with any other family members/significant others, and if so, who? No  Plans to Return to Present

## 2025-01-09 NOTE — DISCHARGE SUMMARY
Hospital Medicine Discharge Summary    Patient: Moose Davila   : 1995     Hospital:  [facility]  Admit Date: 2025   Discharge Date: 2025    Disposition:  [x]Home   []HHC  []SNF  []Acute Rehab  []LTAC  []Hospice  Code status:  [x]Full  []DNR/CCA  []Limited (DNR/CCA with Do Not Intubate)  []DNRCC  Condition at Discharge: Stable  Primary Care Provider: Otis Soto, APRN - CNP    Admitting Provider: Alexander Landaverde DO  Discharge Provider: Roe Estrada MD     Discharge Diagnoses:      Active Hospital Problems    Diagnosis     Atrial fibrillation (HCC) [I48.91]      Priority: High    Cardiomyopathy (HCC) [I42.9]     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 male who presented to [facility] with tachycardia.  PMHx significant for anxiety.       Patient states he came to the emergency department for atrial fibrillation which started at 6 PM yesterday.  Patient was shoveling snow and began to have a headache.  After patient tried to relax but was unable to due to palpitations and chest pressure.  Symptoms continued into the a.m. when patient went to urgent care.  Patient had symptoms of dizziness, tingling, shortness of breath at that time.  At urgent care they did get an EKG and informed him that he needed to go to the emergency department for further evaluation and treatment.  Patient has had prior episodes of atrial fibrillation.  Patient denies syncope, cough, nausea, vomiting, changes in bowel or bladder habits\".        Assessment/Plan:          Atrial Fibrillation with RVR - paroxysmal, of unspecified and clinically unable to determine etiology. Cardiology consulted s/p SARA/DCCV.  Echo ordered and pending, to f/u oupt for medical mgt and ischemic w/up.  Patient w/ hypercoagulable state secondary to Atrial Fibrillation started on

## 2025-01-15 ENCOUNTER — OFFICE VISIT (OUTPATIENT)
Dept: CARDIOLOGY CLINIC | Age: 30
End: 2025-01-15
Payer: COMMERCIAL

## 2025-01-15 VITALS
OXYGEN SATURATION: 96 % | SYSTOLIC BLOOD PRESSURE: 92 MMHG | HEART RATE: 92 BPM | BODY MASS INDEX: 31.5 KG/M2 | HEIGHT: 66 IN | DIASTOLIC BLOOD PRESSURE: 56 MMHG | WEIGHT: 196 LBS

## 2025-01-15 DIAGNOSIS — I48.0 PAROXYSMAL ATRIAL FIBRILLATION (HCC): ICD-10-CM

## 2025-01-15 DIAGNOSIS — Z09 HOSPITAL DISCHARGE FOLLOW-UP: Primary | ICD-10-CM

## 2025-01-15 DIAGNOSIS — I42.9 CARDIOMYOPATHY, UNSPECIFIED TYPE (HCC): ICD-10-CM

## 2025-01-15 PROCEDURE — 1111F DSCHRG MED/CURRENT MED MERGE: CPT | Performed by: NURSE PRACTITIONER

## 2025-01-15 PROCEDURE — G8427 DOCREV CUR MEDS BY ELIG CLIN: HCPCS | Performed by: NURSE PRACTITIONER

## 2025-01-15 PROCEDURE — 99215 OFFICE O/P EST HI 40 MIN: CPT | Performed by: NURSE PRACTITIONER

## 2025-01-15 PROCEDURE — 1036F TOBACCO NON-USER: CPT | Performed by: NURSE PRACTITIONER

## 2025-01-15 PROCEDURE — G8417 CALC BMI ABV UP PARAM F/U: HCPCS | Performed by: NURSE PRACTITIONER

## 2025-01-15 RX ORDER — METOPROLOL SUCCINATE 25 MG/1
25 TABLET, EXTENDED RELEASE ORAL NIGHTLY
Qty: 30 TABLET | Refills: 3 | Status: SHIPPED | OUTPATIENT
Start: 2025-01-15

## 2025-01-15 NOTE — PROGRESS NOTES
[facility]  Cardiac Follow-up    Primary Care Doctor:  No primary care provider on file.    Chief Complaint   Patient presents with    Follow-Up from Hospital        History of Present Illness:  I had the pleasure of seeing Moose Davila in follow up for hospital follow up.   Admitted 1/7/25-1/9/25 with shortness of breath, palpitations, chest pressures. Found to be in RVR rates 190-200's requiring SARA/DCCV. LVEF on SARA 25%. Complete echo showed LVEF 45%.   + anxiety  HR up to 113 while walking in the grocery   Some fatigue after the grocery store walking.   Wearing HR monitor ; apple watch reporting his HR has been 's  Denies any alcohol since discharge. Taking medications as prescribed. Concerns about the cost of the medications.   B/p on the lower side today however took am meds without eating today. Denies any significant dizziness.     Moose Davila describes symptoms including fatigue but denies chest pain, dyspnea, edema.   Family history reviewed- mother diet in Feb after having LVAD for 6-7 years; maternal grandmother with CHF.      Past Medical History:   has a past medical history of Anxiety and Cardiac arrhythmia due to congenital heart disease.  Surgical History:   has a past surgical history that includes [place] tooth extraction (2016).   Social History:   reports that he has never smoked. He has never used smokeless tobacco. He reports current alcohol use of about 4.0 standard drinks of alcohol per week. He reports that he does not use drugs.   Family History:   Family History   Problem Relation Age of Onset    Heart Disease Mother     Heart Failure Mother         had LVAD for 6-7 years    Heart Disease Maternal Grandmother     Heart Failure Maternal Grandmother     Other Maternal Grandfather         aneurysm    Diabetes Maternal Grandfather     No Known Problems Paternal Grandmother     No Known Problems Paternal Grandfather     Heart Disease Maternal Uncle     Heart Disease Maternal

## 2025-01-15 NOTE — PATIENT INSTRUCTIONS
Plan:   Recommend cardiac MRI - call to schedule [phone]   Continue Entresto 24/26mg Twice a day   Continue toprol 25mg daily   Check labs in the next 1 week   Will discuss with interventional cardiology about additional cardiac imaging/studies (ischemic evaluation)   Follow up with EP as planned  Follow up 3 weeks with medication titration     Avoid over the counter cold medications that contain pseudo-ephredrines, phenylephrines, neosynephrines = decongestants  Okay to take antihistamines without the decongestant (Claritin, Allegra, Zyrtec, Benadryl without the \"D\")    Okay to take guaifenesin (Mucinex or Robitussion) to help clear phlegm, okay to take dextromethorphan (Delsym) as cough suppressant.  Okay to take Tylenol    Avoid anti-inflammatory agents including ibuprofen, Motrin, Advil, Aleve, Naprosyn or Naproxen as these can interact with your kidneys and heart medications.  Avoid sodium based antacids (Kathy-[place])  Avoid natural supplements containing sodium (Airborne)      DAILY SELF CARE WITH HEART FAILURE:  ~ Weigh yourself every morning and call your doctor if you gain 3 lb gain in a day -or- 5 lb gain in a week.   ~Follow a No Added Salt/Low Sodium diet of 3000 mg sodium daily  ~Follow a Fluid Limitation of 2 liters (64 ounces daily) on consistent basis  ~Call your doctor if you notice : worsening shortness of breath especially with usual activities or when lying down flat, increased swelling, decreased urination, weight gain of 3 lb overnight or 5 lb gain in a week, increased coughing, or chest pressure or dizziness upon discharge from hospital.   ~Go to all scheduled follow up appointments after hospital stay  ~Take all prescribed medications. Do not stop any-without calling your doctor first.  ~Heart Failure Non Urgent Resource phone number [phone]    Limit salt substitute seasonings as these can increase your potassium level.

## 2025-01-22 DIAGNOSIS — I42.9 CARDIOMYOPATHY, UNSPECIFIED TYPE (HCC): ICD-10-CM

## 2025-01-23 LAB
ANION GAP SERPL CALCULATED.3IONS-SCNC: 12 MMOL/L (ref 3–16)
BUN SERPL-MCNC: 11 MG/DL (ref 7–20)
CALCIUM SERPL-MCNC: 10 MG/DL (ref 8.3–10.6)
CHLORIDE SERPL-SCNC: 101 MMOL/L (ref 99–110)
CO2 SERPL-SCNC: 28 MMOL/L (ref 21–32)
CREAT SERPL-MCNC: 1.1 MG/DL (ref 0.9–1.3)
GFR SERPLBLD CREATININE-BSD FMLA CKD-EPI: >90 ML/MIN/{1.73_M2}
GLUCOSE SERPL-MCNC: 77 MG/DL (ref 70–99)
NT-PROBNP SERPL-MCNC: 51 PG/ML (ref 0–124)
POTASSIUM SERPL-SCNC: 4.3 MMOL/L (ref 3.5–5.1)
SODIUM SERPL-SCNC: 141 MMOL/L (ref 136–145)

## 2025-02-03 NOTE — PROGRESS NOTES
extremities well  Neurological/Psychiatric:  Alert and oriented in all spheres  No abnormalities of mood, affect, memory, mentation, or behavior are noted    Lab Data reviewed and analyzed   CBC:   WBC   Date Value Ref Range Status   01/08/2025 8.7 4.0 - 11.0 K/uL Final   01/07/2025 8.6 4.0 - 11.0 K/uL Final     RBC   Date Value Ref Range Status   01/08/2025 4.71 4.20 - 5.90 M/uL Final   01/07/2025 5.60 4.20 - 5.90 M/uL Final     Hemoglobin   Date Value Ref Range Status   01/08/2025 15.1 13.5 - 17.5 g/dL Final   01/07/2025 17.9 (H) 13.5 - 17.5 g/dL Final     Hematocrit   Date Value Ref Range Status   01/08/2025 43.6 40.5 - 52.5 % Final   01/07/2025 50.8 40.5 - 52.5 % Final     MCV   Date Value Ref Range Status   01/08/2025 92.5 80.0 - 100.0 fL Final   01/07/2025 90.6 80.0 - 100.0 fL Final     RDW   Date Value Ref Range Status   01/08/2025 13.2 12.4 - 15.4 % Final   01/07/2025 13.0 12.4 - 15.4 % Final     Platelets   Date Value Ref Range Status   01/08/2025 311 135 - 450 K/uL Final   01/07/2025 395 135 - 450 K/uL Final     Iron:  No results found for: \"IRON\", \"TIBC\", \"FERRITIN\"  BMP:   Sodium   Date Value Ref Range Status   01/22/2025 141 136 - 145 mmol/L Final   01/08/2025 140 136 - 145 mmol/L Final   01/07/2025 140 136 - 145 mmol/L Final     Potassium   Date Value Ref Range Status   01/22/2025 4.3 3.5 - 5.1 mmol/L Final   01/07/2025 3.7 3.5 - 5.1 mmol/L Final     Potassium reflex Magnesium   Date Value Ref Range Status   01/08/2025 3.8 3.5 - 5.1 mmol/L Final     Comment:     Specimen hemolysis has exceeded the interference as defined by Roche.  Value may be falsely increased. Suggest recollection if clinically  indicated.       Chloride   Date Value Ref Range Status   01/22/2025 101 99 - 110 mmol/L Final   01/08/2025 106 99 - 110 mmol/L Final   01/07/2025 101 99 - 110 mmol/L Final     CO2   Date Value Ref Range Status   01/22/2025 28 21 - 32 mmol/L Final   01/08/2025 22 21 - 32 mmol/L Final   01/07/2025 22 21 - 32

## 2025-02-04 ENCOUNTER — OFFICE VISIT (OUTPATIENT)
Dept: CARDIOLOGY CLINIC | Age: 30
End: 2025-02-04
Payer: COMMERCIAL

## 2025-02-04 VITALS
SYSTOLIC BLOOD PRESSURE: 80 MMHG | BODY MASS INDEX: 31.02 KG/M2 | HEART RATE: 81 BPM | WEIGHT: 193 LBS | OXYGEN SATURATION: 100 % | DIASTOLIC BLOOD PRESSURE: 54 MMHG | HEIGHT: 66 IN

## 2025-02-04 DIAGNOSIS — I95.2 HYPOTENSION DUE TO MEDICATION: ICD-10-CM

## 2025-02-04 DIAGNOSIS — I48.0 PAROXYSMAL ATRIAL FIBRILLATION (HCC): ICD-10-CM

## 2025-02-04 DIAGNOSIS — I42.9 CARDIOMYOPATHY, UNSPECIFIED TYPE (HCC): Primary | ICD-10-CM

## 2025-02-04 PROCEDURE — 1111F DSCHRG MED/CURRENT MED MERGE: CPT | Performed by: NURSE PRACTITIONER

## 2025-02-04 PROCEDURE — 1036F TOBACCO NON-USER: CPT | Performed by: NURSE PRACTITIONER

## 2025-02-04 PROCEDURE — 99214 OFFICE O/P EST MOD 30 MIN: CPT | Performed by: NURSE PRACTITIONER

## 2025-02-04 PROCEDURE — G8417 CALC BMI ABV UP PARAM F/U: HCPCS | Performed by: NURSE PRACTITIONER

## 2025-02-04 PROCEDURE — G8427 DOCREV CUR MEDS BY ELIG CLIN: HCPCS | Performed by: NURSE PRACTITIONER

## 2025-02-04 NOTE — PATIENT INSTRUCTIONS
Plan:   Recommend cardiac MRI - as scheduled   Continue Entresto 24/26mg Twice a day for now; if you have any worse dizziness then will plan to reduce to 1/2 tab in the am and a full tab in the pm.  Continue toprol 25mg daily   Follow up with EP as planned  Follow up in 8 weeks

## 2025-02-17 LAB — ECHO BSA: 2.04 M2

## 2025-02-17 PROCEDURE — 93272 ECG/REVIEW INTERPRET ONLY: CPT | Performed by: INTERNAL MEDICINE

## 2025-02-18 NOTE — RESULT ENCOUNTER NOTE
I spoke with the patient and relayed NARENK message. He V/U. He denies snoring or choking during sleep. He will confirm with family and call out office to let us know if sleep apnea testing needs to be pursued.

## 2025-03-04 DIAGNOSIS — I42.9 CARDIOMYOPATHY, UNSPECIFIED TYPE (HCC): Primary | ICD-10-CM

## 2025-03-04 NOTE — TELEPHONE ENCOUNTER
Pt called requesting a refill for sacubitril-valsartan (ENTRESTO) 24-26 MG per tablet     Preferred pharmacy  [facility] [phone] - [place] [address] [address] [phone] - [place] [phone] [[id number]]     LOV 2/4/25 NPRB  NOV 4/24/25 NPRB

## 2025-03-05 RX ORDER — SACUBITRIL AND VALSARTAN 24; 26 MG/1; MG/1
1 TABLET, FILM COATED ORAL 2 TIMES DAILY
Qty: 180 TABLET | Refills: 1 | Status: SHIPPED | OUTPATIENT
Start: 2025-03-05

## 2025-03-10 ENCOUNTER — TELEPHONE (OUTPATIENT)
Dept: CARDIOLOGY CLINIC | Age: 30
End: 2025-03-10

## 2025-03-10 NOTE — TELEPHONE ENCOUNTER
Submitted PA for Entresto 24-26MG tablets   Via [facility] Key: [id number]  STATUS: PENDING.    Follow up done daily; if no decision with in three days we will refax.  If another three days goes by with no decision will call the insurance for status.

## 2025-03-11 NOTE — TELEPHONE ENCOUNTER
Approved on March 10 by Sonja 2017 N[employer]  Request Reference Number: [id number]. ENTRESTO TAB 24-26MG is approved through 03/10/2026. Your patient may now fill this prescription and it will be covered.  Effective Date: 3/10/2025  Authorization Expiration Date: 3/10/2026

## 2025-03-24 ENCOUNTER — HOSPITAL ENCOUNTER (OUTPATIENT)
Dept: MRI IMAGING | Age: 30
Discharge: HOME | End: 2025-03-24
Payer: COMMERCIAL

## 2025-03-24 DIAGNOSIS — I42.9 CARDIOMYOPATHY, UNSPECIFIED TYPE (HCC): ICD-10-CM

## 2025-03-24 PROCEDURE — A9585 GADOBUTROL INJECTION: HCPCS | Performed by: NURSE PRACTITIONER

## 2025-03-24 PROCEDURE — 6360000004 HC RX CONTRAST MEDICATION: Performed by: NURSE PRACTITIONER

## 2025-03-24 PROCEDURE — 75561 CARDIAC MRI FOR MORPH W/DYE: CPT

## 2025-03-24 PROCEDURE — 75561 CARDIAC MRI FOR MORPH W/DYE: CPT | Performed by: INTERNAL MEDICINE

## 2025-03-24 RX ORDER — GADOBUTROL 604.72 MG/ML
14 INJECTION INTRAVENOUS
Status: COMPLETED | OUTPATIENT
Start: 2025-03-24 | End: 2025-03-24

## 2025-03-24 RX ADMIN — GADOBUTROL 14 ML: 604.72 INJECTION INTRAVENOUS at 16:19

## 2025-03-25 ENCOUNTER — RESULTS FOLLOW-UP (OUTPATIENT)
Dept: CARDIOLOGY CLINIC | Age: 30
End: 2025-03-25

## 2025-03-25 NOTE — RESULT ENCOUNTER NOTE
Reviewed. Really good news! Heart muscle continues to recover and almost normal. EF up to 48%. No swelling in the heart and no concerns for scar or infiltration of the heart muscle.   Heart valves look good as well.   Keep follow up as planned

## 2025-04-04 NOTE — PROGRESS NOTES
including follow up and precautions was discussed with the patient in detail who verbalized understanding on the procedure. All questions were addressed.     Natalia Sheikh DO, FHRS, FAC, FAHA  04/11/25  9:33 AM   [facility]   [phone] [facility]   [phone] [facility]

## 2025-04-11 ENCOUNTER — TELEPHONE (OUTPATIENT)
Dept: CARDIOLOGY CLINIC | Age: 30
End: 2025-04-11

## 2025-04-11 ENCOUNTER — OFFICE VISIT (OUTPATIENT)
Dept: CARDIOLOGY CLINIC | Age: 30
End: 2025-04-11
Payer: COMMERCIAL

## 2025-04-11 VITALS
WEIGHT: 188.71 LBS | SYSTOLIC BLOOD PRESSURE: 86 MMHG | OXYGEN SATURATION: 100 % | DIASTOLIC BLOOD PRESSURE: 52 MMHG | BODY MASS INDEX: 30.33 KG/M2 | HEIGHT: 66 IN | HEART RATE: 66 BPM

## 2025-04-11 DIAGNOSIS — Z87.74 HISTORY OF ATRIAL SEPTAL DEFECT: ICD-10-CM

## 2025-04-11 DIAGNOSIS — Z92.89 HISTORY OF CARDIOVERSION: ICD-10-CM

## 2025-04-11 DIAGNOSIS — I50.22 CHRONIC SYSTOLIC HEART FAILURE (HCC): ICD-10-CM

## 2025-04-11 DIAGNOSIS — I48.0 PAF (PAROXYSMAL ATRIAL FIBRILLATION) (HCC): ICD-10-CM

## 2025-04-11 DIAGNOSIS — I48.0 PAROXYSMAL ATRIAL FIBRILLATION (HCC): Primary | ICD-10-CM

## 2025-04-11 DIAGNOSIS — I42.8 TACHYCARDIA INDUCED CARDIOMYOPATHY: ICD-10-CM

## 2025-04-11 DIAGNOSIS — I48.19 PERSISTENT ATRIAL FIBRILLATION (HCC): Primary | ICD-10-CM

## 2025-04-11 PROCEDURE — G8427 DOCREV CUR MEDS BY ELIG CLIN: HCPCS | Performed by: INTERNAL MEDICINE

## 2025-04-11 PROCEDURE — G8417 CALC BMI ABV UP PARAM F/U: HCPCS | Performed by: INTERNAL MEDICINE

## 2025-04-11 PROCEDURE — 1036F TOBACCO NON-USER: CPT | Performed by: INTERNAL MEDICINE

## 2025-04-11 PROCEDURE — G2211 COMPLEX E/M VISIT ADD ON: HCPCS | Performed by: INTERNAL MEDICINE

## 2025-04-11 PROCEDURE — 99214 OFFICE O/P EST MOD 30 MIN: CPT | Performed by: INTERNAL MEDICINE

## 2025-04-11 PROCEDURE — 93000 ELECTROCARDIOGRAM COMPLETE: CPT | Performed by: INTERNAL MEDICINE

## 2025-04-11 RX ORDER — METOPROLOL SUCCINATE 25 MG/1
12.5 TABLET, EXTENDED RELEASE ORAL NIGHTLY
Qty: 30 TABLET | Refills: 3
Start: 2025-04-11

## 2025-04-15 ENCOUNTER — HOSPITAL ENCOUNTER (INPATIENT)
Age: 30
LOS: 2 days | Discharge: HOME | DRG: 308 | End: 2025-04-17
Attending: EMERGENCY MEDICINE | Admitting: INTERNAL MEDICINE
Payer: COMMERCIAL

## 2025-04-15 ENCOUNTER — HOSPITAL ENCOUNTER (INPATIENT)
Dept: CARDIAC CATH/INVASIVE PROCEDURES | Age: 30
Discharge: HOME | DRG: 308 | End: 2025-04-17
Attending: INTERNAL MEDICINE | Admitting: INTERNAL MEDICINE
Payer: COMMERCIAL

## 2025-04-15 ENCOUNTER — ANESTHESIA EVENT (OUTPATIENT)
Dept: CARDIAC CATH/INVASIVE PROCEDURES | Age: 30
DRG: 308 | End: 2025-04-15
Payer: COMMERCIAL

## 2025-04-15 ENCOUNTER — ANESTHESIA (OUTPATIENT)
Dept: CARDIAC CATH/INVASIVE PROCEDURES | Age: 30
DRG: 308 | End: 2025-04-15
Payer: COMMERCIAL

## 2025-04-15 VITALS
SYSTOLIC BLOOD PRESSURE: 97 MMHG | HEART RATE: 92 BPM | RESPIRATION RATE: 23 BRPM | DIASTOLIC BLOOD PRESSURE: 68 MMHG | OXYGEN SATURATION: 99 %

## 2025-04-15 DIAGNOSIS — I48.0 PAROXYSMAL ATRIAL FIBRILLATION (HCC): Primary | ICD-10-CM

## 2025-04-15 PROBLEM — I48.91 A-FIB (HCC): Status: ACTIVE | Noted: 2025-04-15

## 2025-04-15 PROBLEM — I50.21 ACUTE SYSTOLIC HEART FAILURE (HCC): Status: ACTIVE | Noted: 2025-04-15

## 2025-04-15 PROBLEM — I42.8 TACHYCARDIA INDUCED CARDIOMYOPATHY: Status: ACTIVE | Noted: 2025-01-08

## 2025-04-15 PROBLEM — I95.9 HYPOTENSION: Status: ACTIVE | Noted: 2025-04-15

## 2025-04-15 LAB
ANION GAP SERPL CALCULATED.3IONS-SCNC: 12 MMOL/L (ref 3–16)
BASOPHILS # BLD: 0 K/UL (ref 0–0.2)
BASOPHILS NFR BLD: 0.7 %
BUN SERPL-MCNC: 14 MG/DL (ref 7–20)
CALCIUM SERPL-MCNC: 10.3 MG/DL (ref 8.3–10.6)
CHLORIDE SERPL-SCNC: 106 MMOL/L (ref 99–110)
CO2 SERPL-SCNC: 24 MMOL/L (ref 21–32)
CREAT SERPL-MCNC: 1.1 MG/DL (ref 0.9–1.3)
DEPRECATED RDW RBC AUTO: 13.3 % (ref 12.4–15.4)
EKG ATRIAL RATE: 111 BPM
EKG DIAGNOSIS: NORMAL
EKG DIAGNOSIS: NORMAL
EKG P AXIS: 54 DEGREES
EKG P-R INTERVAL: 190 MS
EKG Q-T INTERVAL: 244 MS
EKG Q-T INTERVAL: 346 MS
EKG QRS DURATION: 102 MS
EKG QRS DURATION: 84 MS
EKG QTC CALCULATION (BAZETT): 425 MS
EKG QTC CALCULATION (BAZETT): 470 MS
EKG R AXIS: 52 DEGREES
EKG R AXIS: 56 DEGREES
EKG T AXIS: 27 DEGREES
EKG T AXIS: 35 DEGREES
EKG VENTRICULAR RATE: 111 BPM
EKG VENTRICULAR RATE: 183 BPM
EOSINOPHIL # BLD: 0.1 K/UL (ref 0–0.6)
EOSINOPHIL NFR BLD: 1.7 %
GFR SERPLBLD CREATININE-BSD FMLA CKD-EPI: >90 ML/MIN/{1.73_M2}
GLUCOSE SERPL-MCNC: 118 MG/DL (ref 70–99)
HCT VFR BLD AUTO: 45.2 % (ref 40.5–52.5)
HGB BLD-MCNC: 16.2 G/DL (ref 13.5–17.5)
LYMPHOCYTES # BLD: 1.8 K/UL (ref 1–5.1)
LYMPHOCYTES NFR BLD: 29.7 %
MAGNESIUM SERPL-MCNC: 2.27 MG/DL (ref 1.8–2.4)
MCH RBC QN AUTO: 32.1 PG (ref 26–34)
MCHC RBC AUTO-ENTMCNC: 35.8 G/DL (ref 31–36)
MCV RBC AUTO: 89.5 FL (ref 80–100)
MONOCYTES # BLD: 0.5 K/UL (ref 0–1.3)
MONOCYTES NFR BLD: 8 %
NEUTROPHILS # BLD: 3.7 K/UL (ref 1.7–7.7)
NEUTROPHILS NFR BLD: 59.9 %
NT-PROBNP SERPL-MCNC: 89 PG/ML (ref 0–124)
PLATELET # BLD AUTO: 315 K/UL (ref 135–450)
PMV BLD AUTO: 8.1 FL (ref 5–10.5)
POTASSIUM SERPL-SCNC: 3.5 MMOL/L (ref 3.5–5.1)
RBC # BLD AUTO: 5.05 M/UL (ref 4.2–5.9)
SODIUM SERPL-SCNC: 142 MMOL/L (ref 136–145)
TROPONIN, HIGH SENSITIVITY: 10 NG/L (ref 0–22)
TROPONIN, HIGH SENSITIVITY: <6 NG/L (ref 0–22)
WBC # BLD AUTO: 6.2 K/UL (ref 4–11)

## 2025-04-15 PROCEDURE — 85025 COMPLETE CBC W/AUTO DIFF WBC: CPT

## 2025-04-15 PROCEDURE — 2580000003 HC RX 258: Performed by: EMERGENCY MEDICINE

## 2025-04-15 PROCEDURE — 2580000003 HC RX 258

## 2025-04-15 PROCEDURE — 6360000002 HC RX W HCPCS: Performed by: INTERNAL MEDICINE

## 2025-04-15 PROCEDURE — 36415 COLL VENOUS BLD VENIPUNCTURE: CPT

## 2025-04-15 PROCEDURE — 6370000000 HC RX 637 (ALT 250 FOR IP): Performed by: INTERNAL MEDICINE

## 2025-04-15 PROCEDURE — 84484 ASSAY OF TROPONIN QUANT: CPT

## 2025-04-15 PROCEDURE — 93005 ELECTROCARDIOGRAM TRACING: CPT | Performed by: INTERNAL MEDICINE

## 2025-04-15 PROCEDURE — 99285 EMERGENCY DEPT VISIT HI MDM: CPT

## 2025-04-15 PROCEDURE — B24BZZ4 ULTRASONOGRAPHY OF HEART WITH AORTA, TRANSESOPHAGEAL: ICD-10-PCS | Performed by: INTERNAL MEDICINE

## 2025-04-15 PROCEDURE — 3700000000 HC ANESTHESIA ATTENDED CARE: Performed by: INTERNAL MEDICINE

## 2025-04-15 PROCEDURE — 6360000002 HC RX W HCPCS

## 2025-04-15 PROCEDURE — 93010 ELECTROCARDIOGRAM REPORT: CPT | Performed by: INTERNAL MEDICINE

## 2025-04-15 PROCEDURE — 2000000000 HC ICU R&B

## 2025-04-15 PROCEDURE — 7100000010 HC PHASE II RECOVERY - FIRST 15 MIN: Performed by: INTERNAL MEDICINE

## 2025-04-15 PROCEDURE — 93005 ELECTROCARDIOGRAM TRACING: CPT | Performed by: EMERGENCY MEDICINE

## 2025-04-15 PROCEDURE — 96360 HYDRATION IV INFUSION INIT: CPT

## 2025-04-15 PROCEDURE — 99291 CRITICAL CARE FIRST HOUR: CPT | Performed by: INTERNAL MEDICINE

## 2025-04-15 PROCEDURE — 83735 ASSAY OF MAGNESIUM: CPT

## 2025-04-15 PROCEDURE — 7100000011 HC PHASE II RECOVERY - ADDTL 15 MIN: Performed by: INTERNAL MEDICINE

## 2025-04-15 PROCEDURE — 83880 ASSAY OF NATRIURETIC PEPTIDE: CPT

## 2025-04-15 PROCEDURE — 80048 BASIC METABOLIC PNL TOTAL CA: CPT

## 2025-04-15 PROCEDURE — 93320 DOPPLER ECHO COMPLETE: CPT

## 2025-04-15 PROCEDURE — 3700000001 HC ADD 15 MINUTES (ANESTHESIA): Performed by: INTERNAL MEDICINE

## 2025-04-15 PROCEDURE — 5A2204Z RESTORATION OF CARDIAC RHYTHM, SINGLE: ICD-10-PCS | Performed by: INTERNAL MEDICINE

## 2025-04-15 PROCEDURE — 2500000003 HC RX 250 WO HCPCS: Performed by: INTERNAL MEDICINE

## 2025-04-15 RX ORDER — AMIODARONE HYDROCHLORIDE 200 MG/1
400 TABLET ORAL 3 TIMES DAILY
Status: DISCONTINUED | OUTPATIENT
Start: 2025-04-15 | End: 2025-04-17

## 2025-04-15 RX ORDER — SODIUM CHLORIDE 0.9 % (FLUSH) 0.9 %
5-40 SYRINGE (ML) INJECTION EVERY 12 HOURS SCHEDULED
Status: CANCELLED | OUTPATIENT
Start: 2025-04-15

## 2025-04-15 RX ORDER — SODIUM CHLORIDE 0.9 % (FLUSH) 0.9 %
5-40 SYRINGE (ML) INJECTION EVERY 12 HOURS SCHEDULED
Status: DISCONTINUED | OUTPATIENT
Start: 2025-04-15 | End: 2025-04-18 | Stop reason: HOSPADM

## 2025-04-15 RX ORDER — ASPIRIN 325 MG
325 TABLET ORAL ONCE
Status: DISCONTINUED | OUTPATIENT
Start: 2025-04-15 | End: 2025-04-18 | Stop reason: HOSPADM

## 2025-04-15 RX ORDER — SODIUM CHLORIDE 9 MG/ML
INJECTION, SOLUTION INTRAVENOUS PRN
Status: CANCELLED | OUTPATIENT
Start: 2025-04-15

## 2025-04-15 RX ORDER — ONDANSETRON 2 MG/ML
4 INJECTION INTRAMUSCULAR; INTRAVENOUS EVERY 6 HOURS PRN
Status: DISCONTINUED | OUTPATIENT
Start: 2025-04-15 | End: 2025-04-18 | Stop reason: HOSPADM

## 2025-04-15 RX ORDER — PROPOFOL 10 MG/ML
INJECTION, EMULSION INTRAVENOUS
Status: DISCONTINUED | OUTPATIENT
Start: 2025-04-15 | End: 2025-04-15 | Stop reason: SDUPTHER

## 2025-04-15 RX ORDER — ACETAMINOPHEN 325 MG/1
650 TABLET ORAL EVERY 6 HOURS PRN
Status: DISCONTINUED | OUTPATIENT
Start: 2025-04-15 | End: 2025-04-17 | Stop reason: HOSPADM

## 2025-04-15 RX ORDER — ONDANSETRON 2 MG/ML
4 INJECTION INTRAMUSCULAR; INTRAVENOUS EVERY 6 HOURS PRN
Status: CANCELLED | OUTPATIENT
Start: 2025-04-15

## 2025-04-15 RX ORDER — ASPIRIN 325 MG
325 TABLET ORAL ONCE
Status: CANCELLED | OUTPATIENT
Start: 2025-04-15 | End: 2025-04-15

## 2025-04-15 RX ORDER — 0.9 % SODIUM CHLORIDE 0.9 %
1000 INTRAVENOUS SOLUTION INTRAVENOUS ONCE
Status: COMPLETED | OUTPATIENT
Start: 2025-04-15 | End: 2025-04-15

## 2025-04-15 RX ORDER — LORAZEPAM 0.5 MG/1
0.5 TABLET ORAL
Status: CANCELLED | OUTPATIENT
Start: 2025-04-15

## 2025-04-15 RX ORDER — LORAZEPAM 0.5 MG/1
0.5 TABLET ORAL
Status: DISCONTINUED | OUTPATIENT
Start: 2025-04-15 | End: 2025-04-18 | Stop reason: HOSPADM

## 2025-04-15 RX ORDER — SODIUM CHLORIDE 0.9 % (FLUSH) 0.9 %
5-40 SYRINGE (ML) INJECTION PRN
Status: DISCONTINUED | OUTPATIENT
Start: 2025-04-15 | End: 2025-04-17 | Stop reason: HOSPADM

## 2025-04-15 RX ORDER — SODIUM CHLORIDE 9 MG/ML
INJECTION, SOLUTION INTRAVENOUS PRN
Status: DISCONTINUED | OUTPATIENT
Start: 2025-04-15 | End: 2025-04-18 | Stop reason: HOSPADM

## 2025-04-15 RX ORDER — AMIODARONE HYDROCHLORIDE 200 MG/1
400 TABLET ORAL 3 TIMES DAILY
Status: DISCONTINUED | OUTPATIENT
Start: 2025-04-15 | End: 2025-04-15

## 2025-04-15 RX ORDER — LIDOCAINE HYDROCHLORIDE 20 MG/ML
INJECTION, SOLUTION INFILTRATION; PERINEURAL
Status: DISCONTINUED | OUTPATIENT
Start: 2025-04-15 | End: 2025-04-15 | Stop reason: SDUPTHER

## 2025-04-15 RX ORDER — ONDANSETRON 2 MG/ML
4 INJECTION INTRAMUSCULAR; INTRAVENOUS EVERY 6 HOURS PRN
Status: DISCONTINUED | OUTPATIENT
Start: 2025-04-15 | End: 2025-04-17 | Stop reason: HOSPADM

## 2025-04-15 RX ORDER — ONDANSETRON 4 MG/1
4 TABLET, ORALLY DISINTEGRATING ORAL EVERY 8 HOURS PRN
Status: DISCONTINUED | OUTPATIENT
Start: 2025-04-15 | End: 2025-04-17 | Stop reason: HOSPADM

## 2025-04-15 RX ORDER — DOPAMINE HYDROCHLORIDE 160 MG/100ML
1-20 INJECTION, SOLUTION INTRAVENOUS CONTINUOUS
Status: DISCONTINUED | OUTPATIENT
Start: 2025-04-15 | End: 2025-04-16

## 2025-04-15 RX ORDER — SODIUM CHLORIDE 0.9 % (FLUSH) 0.9 %
5-40 SYRINGE (ML) INJECTION PRN
Status: CANCELLED | OUTPATIENT
Start: 2025-04-15

## 2025-04-15 RX ORDER — METOPROLOL SUCCINATE 25 MG/1
12.5 TABLET, EXTENDED RELEASE ORAL NIGHTLY
Status: DISCONTINUED | OUTPATIENT
Start: 2025-04-15 | End: 2025-04-17

## 2025-04-15 RX ORDER — DILTIAZEM HYDROCHLORIDE 5 MG/ML
5 INJECTION INTRAVENOUS ONCE
Status: DISCONTINUED | OUTPATIENT
Start: 2025-04-15 | End: 2025-04-15

## 2025-04-15 RX ORDER — FLECAINIDE ACETATE 100 MG/1
200 TABLET ORAL ONCE
Status: COMPLETED | OUTPATIENT
Start: 2025-04-15 | End: 2025-04-15

## 2025-04-15 RX ORDER — SODIUM CHLORIDE 0.9 % (FLUSH) 0.9 %
5-40 SYRINGE (ML) INJECTION EVERY 12 HOURS SCHEDULED
Status: DISCONTINUED | OUTPATIENT
Start: 2025-04-15 | End: 2025-04-17 | Stop reason: HOSPADM

## 2025-04-15 RX ORDER — ACETAMINOPHEN 650 MG/1
650 SUPPOSITORY RECTAL EVERY 6 HOURS PRN
Status: DISCONTINUED | OUTPATIENT
Start: 2025-04-15 | End: 2025-04-17 | Stop reason: HOSPADM

## 2025-04-15 RX ORDER — DOPAMINE HYDROCHLORIDE 160 MG/100ML
1-20 INJECTION, SOLUTION INTRAVENOUS CONTINUOUS
Status: DISCONTINUED | OUTPATIENT
Start: 2025-04-15 | End: 2025-04-15

## 2025-04-15 RX ORDER — PHENYLEPHRINE HCL IN 0.9% NACL 1 MG/10 ML
SYRINGE (ML) INTRAVENOUS
Status: DISCONTINUED | OUTPATIENT
Start: 2025-04-15 | End: 2025-04-15 | Stop reason: SDUPTHER

## 2025-04-15 RX ORDER — SODIUM CHLORIDE 0.9 % (FLUSH) 0.9 %
5-40 SYRINGE (ML) INJECTION PRN
Status: DISCONTINUED | OUTPATIENT
Start: 2025-04-15 | End: 2025-04-18 | Stop reason: HOSPADM

## 2025-04-15 RX ORDER — SODIUM CHLORIDE 9 MG/ML
INJECTION, SOLUTION INTRAVENOUS PRN
Status: DISCONTINUED | OUTPATIENT
Start: 2025-04-15 | End: 2025-04-17 | Stop reason: HOSPADM

## 2025-04-15 RX ORDER — POLYETHYLENE GLYCOL 3350 17 G/17G
17 POWDER, FOR SOLUTION ORAL DAILY PRN
Status: DISCONTINUED | OUTPATIENT
Start: 2025-04-15 | End: 2025-04-17 | Stop reason: HOSPADM

## 2025-04-15 RX ADMIN — AMIODARONE HYDROCHLORIDE 400 MG: 200 TABLET ORAL at 16:51

## 2025-04-15 RX ADMIN — AMIODARONE HYDROCHLORIDE 150 MG: 1.5 INJECTION, SOLUTION INTRAVENOUS at 17:11

## 2025-04-15 RX ADMIN — FLECAINIDE ACETATE 200 MG: 100 TABLET ORAL at 12:33

## 2025-04-15 RX ADMIN — PROPOFOL 30 MG: 10 INJECTION, EMULSION INTRAVENOUS at 14:29

## 2025-04-15 RX ADMIN — Medication 100 MCG: at 14:28

## 2025-04-15 RX ADMIN — PROPOFOL 30 MG: 10 INJECTION, EMULSION INTRAVENOUS at 14:31

## 2025-04-15 RX ADMIN — PROPOFOL 20 MG: 10 INJECTION, EMULSION INTRAVENOUS at 14:30

## 2025-04-15 RX ADMIN — PROPOFOL 20 MG: 10 INJECTION, EMULSION INTRAVENOUS at 14:34

## 2025-04-15 RX ADMIN — Medication 100 MCG: at 14:31

## 2025-04-15 RX ADMIN — Medication 100 MCG: at 14:41

## 2025-04-15 RX ADMIN — SODIUM CHLORIDE 1000 ML: 0.9 INJECTION, SOLUTION INTRAVENOUS at 11:32

## 2025-04-15 RX ADMIN — LIDOCAINE HYDROCHLORIDE 60 MG: 20 INJECTION, SOLUTION INFILTRATION; PERINEURAL at 14:28

## 2025-04-15 RX ADMIN — PROPOFOL 50 MG: 10 INJECTION, EMULSION INTRAVENOUS at 14:35

## 2025-04-15 RX ADMIN — PROPOFOL 30 MG: 10 INJECTION, EMULSION INTRAVENOUS at 14:33

## 2025-04-15 RX ADMIN — Medication 100 MCG: at 14:29

## 2025-04-15 RX ADMIN — SODIUM CHLORIDE: 9 INJECTION, SOLUTION INTRAVENOUS at 14:24

## 2025-04-15 RX ADMIN — AMIODARONE HYDROCHLORIDE 1 MG/MIN: 1.8 INJECTION, SOLUTION INTRAVENOUS at 17:32

## 2025-04-15 RX ADMIN — AMIODARONE HYDROCHLORIDE 400 MG: 200 TABLET ORAL at 21:03

## 2025-04-15 RX ADMIN — DOPAMINE HYDROCHLORIDE 2 MCG/KG/MIN: 160 INJECTION, SOLUTION INTRAVENOUS at 16:58

## 2025-04-15 RX ADMIN — PROPOFOL 70 MG: 10 INJECTION, EMULSION INTRAVENOUS at 14:28

## 2025-04-15 RX ADMIN — AMIODARONE HYDROCHLORIDE 1 MG/MIN: 1.8 INJECTION, SOLUTION INTRAVENOUS at 22:43

## 2025-04-15 ASSESSMENT — PAIN - FUNCTIONAL ASSESSMENT
PAIN_FUNCTIONAL_ASSESSMENT: 0-10
PAIN_FUNCTIONAL_ASSESSMENT: NONE - DENIES PAIN

## 2025-04-15 ASSESSMENT — ENCOUNTER SYMPTOMS
SORE THROAT: 0
EYE REDNESS: 0
ABDOMINAL PAIN: 0
SHORTNESS OF BREATH: 0
RHINORRHEA: 0

## 2025-04-15 ASSESSMENT — PAIN DESCRIPTION - DESCRIPTORS: DESCRIPTORS: TIGHTNESS

## 2025-04-15 ASSESSMENT — PAIN SCALES - GENERAL
PAINLEVEL_OUTOF10: 0
PAINLEVEL_OUTOF10: 2
PAINLEVEL_OUTOF10: 0
PAINLEVEL_OUTOF10: 0

## 2025-04-15 ASSESSMENT — PAIN DESCRIPTION - LOCATION: LOCATION: CHEST

## 2025-04-15 NOTE — ED NOTES
Patient ambulated to the restroom independently without any difficulties. Patient returned to the bed and placed on heart monitor and pads. Patients heart rate is 102 and asymptomatic. Patients denies chest but states has some shortness of breath.

## 2025-04-15 NOTE — ANESTHESIA PRE PROCEDURE
Department of Anesthesiology  Preprocedure Note       Name:  Moose Davila   Age:  29 y.o.  :  1995                                          MRN:  [id number]         Date:  4/15/2025      Surgeon: * No surgeons listed *    Procedure: * No procedures listed *    Medications prior to admission:   Prior to Admission medications    Medication Sig Start Date End Date Taking? Authorizing Provider   metoprolol succinate (TOPROL XL) 25 MG extended release tablet Take 0.5 tablets by mouth at bedtime 25   Natalia Sheikh DO   sacubitril-valsartan (ENTRESTO) 24-26 MG per tablet Take 1 tablet by mouth 2 times daily 3/5/25   Allison Vieyra APRN - CNP   rivaroxaban (XARELTO) 20 MG TABS tablet Take 1 tablet by mouth daily 1/15/25   Allison Viyera APRN - CNP       Current medications:    No current facility-administered medications for this encounter.     Current Outpatient Medications   Medication Sig Dispense Refill   • metoprolol succinate (TOPROL XL) 25 MG extended release tablet Take 0.5 tablets by mouth at bedtime 30 tablet 3   • sacubitril-valsartan (ENTRESTO) 24-26 MG per tablet Take 1 tablet by mouth 2 times daily 180 tablet 1   • rivaroxaban (XARELTO) 20 MG TABS tablet Take 1 tablet by mouth daily 30 tablet 3     Facility-Administered Medications Ordered in Other Encounters   Medication Dose Route Frequency Provider Last Rate Last Admin   • dilTIAZem injection 5 mg  5 mg IntraVENous Once Baker, Chacorta Sharma MD        Followed by   • dilTIAZem 100 mg in sodium chloride 0.9 % 100 mL infusion ([place])  2.5-15 mg/hr IntraVENous Continuous Baker, Chacorta Sharma MD           Allergies:    Allergies   Allergen Reactions   • Amoxicillin Rash       Problem List:    Patient Active Problem List   Diagnosis Code   • Anxiety F41.9   • Encounter to establish care Z76.89   • Cardiac arrhythmia I49.9   • Moderate episode of recurrent major depressive disorder (HCC) F33.1   • Social anxiety disorder

## 2025-04-15 NOTE — ED NOTES
@1140 called cardiology per Dr. Baker   RE: Afib with RVR.  @1219 Dr. Altamirano called back and spoke with Dr. Baker

## 2025-04-15 NOTE — H&P
CARDIAC ELECTROPHYSIOLOGY CONSULT NOTE  Patient Name: Moose Davila  YOB: 1995    Primary Care Physician: Nalini, Pcp    CHIEF COMPLAINT:     Moose Davila was seen today on 4/15/2025 in consultation due to chief complaint of atrial fibrillation.    Patient is a 29 y.o. male with PMH of atrial fibrillation, ASD, sinus tachycardia, palpitations, anxiety, depression who was seen today for atrial fibrillation.     In 1/2025 patient presented to the ED with complaints of palpitations. Upon presentation to the ED his heart rate was 200bpm. Vagal maneuvers were attempted which decreased the heart rate to the 150s transiently. He also received adenosine with no change in arrhythmia. He was started on diltiazem drip. On 1/7/2025 he underwent successful SARA/CV, SARA showed an LVEF of 25-30%. Echo 1/8/2025 demonstrated an LVEF of 45-50%. Patient was discharged wearing 30 day cardiac event monitor and with recommendation for follow up with heart failure team as outpatient.     Patient wore a cardiac event monitor from 1/8/2025 to 2/7/2025 which demonstrated predominately SR with an average HR of 76 () BPM, very rare PACs and PVCs. Cardiac MRI 3/24/2025 demonstrated an LVEF of 48%, no evidence of edema, infarction, or infiltration. Findings most consistent with a non-specific, non-ischemic cardiomyopathy.      Today, patient presents with symptomatic atrial fibrillation. He is hypotensive.      ALLERGIES:   Allergies   Allergen Reactions    Amoxicillin Rash        MEDICATIONS:   Current Facility-Administered Medications   Medication Dose Route Frequency Provider Last Rate Last Admin    sodium chloride flush 0.9 % injection 5-40 mL  5-40 mL IntraVENous 2 times per day Eboni Smith APRN - CNP        sodium chloride flush 0.9 % injection 5-40 mL  5-40 mL IntraVENous PRN Eboni Smith APRN - CNP        0.9 % sodium chloride infusion   IntraVENous PRN Eboni Smith APRN - CNP        aspirin tablet 325 mg  325

## 2025-04-15 NOTE — ANESTHESIA POSTPROCEDURE EVALUATION
Department of Anesthesiology  Postprocedure Note    Patient: Moose Davila  MRN: [id number]  YOB: 1995  Date of evaluation: 4/15/2025    Procedure Summary       Date: 04/15/25 Room / Location: [facility]    Anesthesia Start: 1424 Anesthesia Stop: 1451    Procedure: SARA W/ POSSIBLE CARDIOVERSION (PRN CONTRAST/BUBBLE/3D) Diagnosis:       Paroxysmal atrial fibrillation (HCC)      A-fib (HCC)    Scheduled Providers: Natalia Sehikh DO Responsible Provider: Pk Roman MD    Anesthesia Type: MAC ASA Status: 3            Anesthesia Type: No value filed.    Mounika Phase I: Mounika Score: 10    Mounika Phase II:      Anesthesia Post Evaluation    Patient location during evaluation: bedside  Patient participation: complete - patient participated  Level of consciousness: awake and alert  Airway patency: patent  Nausea & Vomiting: no nausea and no vomiting  Cardiovascular status: blood pressure returned to baseline  Respiratory status: acceptable  Hydration status: euvolemic  Comments: VSS on transfer to phase 2 recovery.  No anesthetic complications.  Pain management: adequate    No notable events documented.

## 2025-04-15 NOTE — PROGRESS NOTES
Received pt from cath lab.  VSS afebrile.  NSR--no ectopy.  Denies chest pain, dizziness, nausea.  Settled into room 227.    Dr. Sheikh called for orders.

## 2025-04-15 NOTE — ED PROVIDER NOTES
[facility] EMERGENCY DEPARTMENT     EMERGENCY DEPARTMENT ENCOUNTER            Pt Name: Moose Davila   MRN: [id number]   Birthdate 1995   Date of evaluation: 4/15/2025   Provider: Chacorta Baker MD   PCP: Nalini, Pcp   Note Started: 11:27 AM EDT 4/15/25          CHIEF COMPLAINT     Chief Complaint   Patient presents with    Palpitations     Pt with palpitations and SOB started this AM.  Denies CP.  States apple watch told him he was in a.fib.  has a HX of A fib in 1/2025 and needed cardioversion at that time             HISTORY OF PRESENT ILLNESS:   History from : Patient   Limitations to history : None     Moose Davila is a 29 y.o. male who presents to the emergency department complaining of atrial fibrillation.  The patient states in January of this year he had his first episode of atrial fibrillation.  He saw cardiology and was cardioverted.  He states that he takes a beta-blocker at night.  Last night he only took half dose because his blood pressure was soft.  He states this morning while at work he developed A-fib at approximately 9:30 AM.  He felt the palpitations then.  He states he did not feel it at all when he woke up this morning.  He denies any chest pain or shortness of breath.  States his Apple Watch told him he was in A-fib this morning.  Patient states that he used to drink a good amount of alcohol but is now down to 1 night a week of his sixpack of beer.  The patient does take a DOAC.  He states he has been compliant with that    Nursing Notes were all reviewed and agreed with, or any disagreements were addressed in the HPI.     REVIEW OF SYSTEMS :    Review of Systems   Constitutional:  Negative for fever.   HENT:  Negative for rhinorrhea and sore throat.    Eyes:  Negative for redness.   Respiratory:  Negative for shortness of breath.    Cardiovascular:  Positive for palpitations. Negative for chest pain.   Gastrointestinal:  Negative for abdominal pain.   Genitourinary:

## 2025-04-15 NOTE — PROCEDURES
PROCEDURE NOTE  Date: 4/15/2025   Name: Mooes Davila  YOB: 1995    Procedures    4/15/2025    PROCEDURE PERFORMED:     1. A Complete Transesophageal echocardiogram with color and spectral doppler and 3-D imaging was performed.   2. Direct current cardioversion.    INDICATIONS: Symptomatic Atrial fibrillation.     PROCEDURE: The patient was brought to the lab in fasting state. The patient received adequate sedation with the assistance of anesthesia for the case. After ensuring adequate sedation, the esophagus was intubated and a complete transesophageal echocardiogram was completed. There were no complications related to the study. Following clearing the left atrial appendage/left atrium of thrombus, the patient was prepped for cardioversion. A synchronized shock delivering 200 Joules energy was given. The patient was successfully cardioverted to normal sinus rhythm.  The patient tolerated the procedure well. Post-procedure examination notable for stable vitals with hemodynamic and neurovascular recovery.    POST PROCEDURE EKG: Sinus rhythm     CONCLUSION: Successful direct current cardioversion.     PLAN:   1. Continue systemic anticoagulation relative 20 mg daily-she takes it during the morning-he did take it this morning.   2.  Will start patient on IV and p.o. amiodarone given his worsening tachycardia induced cardiomyopathy.  Will also start the patient on dopamine given his significant hypotension on admission and prior to cardioversion with hemodynamic instability even in sinus rhythm.    Natalia Sheikh DO   [facility] [place]   [phone] [place] office   [phone] [facility]

## 2025-04-15 NOTE — PROGRESS NOTES
Shift: 07-19    Reason for ICU Admission: afib s/p SARA/Cardioversion    Most recent vitals: BP 98/69   Pulse 93   Temp 98 °F (36.7 °C) (Oral)   Resp 23   Ht 1.651 m (5' 5\")   Wt 82.2 kg (181 lb 3.2 oz)   SpO2 98%   BMI 30.15 kg/m²      Drip rates at handoff:    sodium chloride      DOPamine 2 mcg/kg/min (04/15/25 1849)    amiodarone 1 mg/min (04/15/25 1732)    Followed by    amiodarone         Current O2:   [x] Room Air   [] NC  L   [] BiPaP    [] Vented  % Fi02,  Peep    Hospital Course:  ICU Day # 1   Admitted to ICU post SARA/Cardioversion  Pt with long history of afib with previous cardioversion January, 2025  EF 10-15%   Started on Amiodarone 1 mg and Dopamine 2 mcg  Amiodarone 400 mg po given  Tolerating diet well

## 2025-04-16 ENCOUNTER — APPOINTMENT (OUTPATIENT)
Age: 30
DRG: 308 | End: 2025-04-16
Payer: COMMERCIAL

## 2025-04-16 PROBLEM — I42.0 DILATED CARDIOMYOPATHY (HCC): Status: ACTIVE | Noted: 2025-04-16

## 2025-04-16 PROBLEM — Z79.899 ENCOUNTER FOR MONITORING AMIODARONE THERAPY: Status: ACTIVE | Noted: 2025-04-16

## 2025-04-16 PROBLEM — Z51.81 ENCOUNTER FOR MONITORING AMIODARONE THERAPY: Status: ACTIVE | Noted: 2025-04-16

## 2025-04-16 PROBLEM — I50.20 HFREF (HEART FAILURE WITH REDUCED EJECTION FRACTION) (HCC): Status: ACTIVE | Noted: 2025-04-16

## 2025-04-16 LAB
ALBUMIN SERPL-MCNC: 4 G/DL (ref 3.4–5)
ALBUMIN/GLOB SERPL: 1.9 {RATIO} (ref 1.1–2.2)
ALP SERPL-CCNC: 47 U/L (ref 40–129)
ALT SERPL-CCNC: 37 U/L (ref 10–40)
ANION GAP SERPL CALCULATED.3IONS-SCNC: 10 MMOL/L (ref 3–16)
AST SERPL-CCNC: 21 U/L (ref 15–37)
BASOPHILS # BLD: 0 K/UL (ref 0–0.2)
BASOPHILS NFR BLD: 0.5 %
BILIRUB SERPL-MCNC: 0.9 MG/DL (ref 0–1)
BUN SERPL-MCNC: 11 MG/DL (ref 7–20)
CALCIUM SERPL-MCNC: 9 MG/DL (ref 8.3–10.6)
CHLORIDE SERPL-SCNC: 105 MMOL/L (ref 99–110)
CO2 SERPL-SCNC: 25 MMOL/L (ref 21–32)
CREAT SERPL-MCNC: 1 MG/DL (ref 0.9–1.3)
DEPRECATED RDW RBC AUTO: 13.6 % (ref 12.4–15.4)
ECHO BSA: 1.94 M2
ECHO BSA: 1.94 M2
ECHO LV EDV 3D: 147 ML
ECHO LV EDV A2C: 198 ML
ECHO LV EDV A4C: 179 ML
ECHO LV EDV BP: 195 ML (ref 67–155)
ECHO LV EDV INDEX 3D: 77 ML/M2
ECHO LV EDV INDEX A4C: 94 ML/M2
ECHO LV EDV INDEX BP: 103 ML/M2
ECHO LV EDV NDEX A2C: 104 ML/M2
ECHO LV EF PHYSICIAN: 48 %
ECHO LV EJECTION FRACTION 3D: 50 %
ECHO LV EJECTION FRACTION A2C: 19 %
ECHO LV EJECTION FRACTION A4C: 23 %
ECHO LV EJECTION FRACTION BIPLANE: 22 % (ref 55–100)
ECHO LV ESV 3D: 73 ML
ECHO LV ESV A2C: 160 ML
ECHO LV ESV A4C: 138 ML
ECHO LV ESV BP: 153 ML (ref 22–58)
ECHO LV ESV INDEX 3D: 38 ML/M2
ECHO LV ESV INDEX A2C: 84 ML/M2
ECHO LV ESV INDEX A4C: 73 ML/M2
ECHO LV ESV INDEX BP: 81 ML/M2
ECHO LV FRACTIONAL SHORTENING: 25 % (ref 28–44)
ECHO LV GLOBAL LONGITUDINAL STRAIN (GLS): -11.6 %
ECHO LV INTERNAL DIMENSION DIASTOLE INDEX: 2.95 CM/M2
ECHO LV INTERNAL DIMENSION DIASTOLIC: 5.6 CM (ref 4.2–5.9)
ECHO LV INTERNAL DIMENSION SYSTOLIC INDEX: 2.21 CM/M2
ECHO LV INTERNAL DIMENSION SYSTOLIC: 4.2 CM
ECHO LV IVSD: 0.7 CM (ref 0.6–1)
ECHO LV MASS 2D: 152.3 G (ref 88–224)
ECHO LV MASS 3D INDEX: 81.1 G/M2
ECHO LV MASS 3D: 154 G
ECHO LV MASS INDEX 2D: 80.1 G/M2 (ref 49–115)
ECHO LV POSTERIOR WALL DIASTOLIC: 0.8 CM (ref 0.6–1)
ECHO LV RELATIVE WALL THICKNESS RATIO: 0.29
EOSINOPHIL # BLD: 0.2 K/UL (ref 0–0.6)
EOSINOPHIL NFR BLD: 1.5 %
GFR SERPLBLD CREATININE-BSD FMLA CKD-EPI: >90 ML/MIN/{1.73_M2}
GLUCOSE SERPL-MCNC: 101 MG/DL (ref 70–99)
HCT VFR BLD AUTO: 39.3 % (ref 40.5–52.5)
HGB BLD-MCNC: 14 G/DL (ref 13.5–17.5)
LYMPHOCYTES # BLD: 2.5 K/UL (ref 1–5.1)
LYMPHOCYTES NFR BLD: 24.8 %
MCH RBC QN AUTO: 32 PG (ref 26–34)
MCHC RBC AUTO-ENTMCNC: 35.7 G/DL (ref 31–36)
MCV RBC AUTO: 89.6 FL (ref 80–100)
MONOCYTES # BLD: 0.6 K/UL (ref 0–1.3)
MONOCYTES NFR BLD: 6.3 %
NEUTROPHILS # BLD: 6.6 K/UL (ref 1.7–7.7)
NEUTROPHILS NFR BLD: 66.9 %
PLATELET # BLD AUTO: 262 K/UL (ref 135–450)
PMV BLD AUTO: 7.6 FL (ref 5–10.5)
POTASSIUM SERPL-SCNC: 3.6 MMOL/L (ref 3.5–5.1)
PROT SERPL-MCNC: 6.1 G/DL (ref 6.4–8.2)
RBC # BLD AUTO: 4.38 M/UL (ref 4.2–5.9)
SODIUM SERPL-SCNC: 140 MMOL/L (ref 136–145)
WBC # BLD AUTO: 9.9 K/UL (ref 4–11)

## 2025-04-16 PROCEDURE — 2000000000 HC ICU R&B

## 2025-04-16 PROCEDURE — 6370000000 HC RX 637 (ALT 250 FOR IP): Performed by: INTERNAL MEDICINE

## 2025-04-16 PROCEDURE — 36415 COLL VENOUS BLD VENIPUNCTURE: CPT

## 2025-04-16 PROCEDURE — 93320 DOPPLER ECHO COMPLETE: CPT | Performed by: INTERNAL MEDICINE

## 2025-04-16 PROCEDURE — 93325 DOPPLER ECHO COLOR FLOW MAPG: CPT | Performed by: INTERNAL MEDICINE

## 2025-04-16 PROCEDURE — 99232 SBSQ HOSP IP/OBS MODERATE 35: CPT | Performed by: NURSE PRACTITIONER

## 2025-04-16 PROCEDURE — 2500000003 HC RX 250 WO HCPCS: Performed by: INTERNAL MEDICINE

## 2025-04-16 PROCEDURE — 92960 CARDIOVERSION ELECTRIC EXT: CPT | Performed by: INTERNAL MEDICINE

## 2025-04-16 PROCEDURE — 93356 MYOCRD STRAIN IMG SPCKL TRCK: CPT | Performed by: INTERNAL MEDICINE

## 2025-04-16 PROCEDURE — 6370000000 HC RX 637 (ALT 250 FOR IP): Performed by: STUDENT IN AN ORGANIZED HEALTH CARE EDUCATION/TRAINING PROGRAM

## 2025-04-16 PROCEDURE — 93308 TTE F-UP OR LMTD: CPT | Performed by: INTERNAL MEDICINE

## 2025-04-16 PROCEDURE — 85025 COMPLETE CBC W/AUTO DIFF WBC: CPT

## 2025-04-16 PROCEDURE — 80053 COMPREHEN METABOLIC PANEL: CPT

## 2025-04-16 PROCEDURE — 93312 ECHO TRANSESOPHAGEAL: CPT | Performed by: INTERNAL MEDICINE

## 2025-04-16 PROCEDURE — 76376 3D RENDER W/INTRP POSTPROCES: CPT | Performed by: INTERNAL MEDICINE

## 2025-04-16 PROCEDURE — 93356 MYOCRD STRAIN IMG SPCKL TRCK: CPT

## 2025-04-16 PROCEDURE — 6370000000 HC RX 637 (ALT 250 FOR IP)

## 2025-04-16 PROCEDURE — 6370000000 HC RX 637 (ALT 250 FOR IP): Performed by: NURSE PRACTITIONER

## 2025-04-16 RX ORDER — POTASSIUM CHLORIDE 1500 MG/1
20 TABLET, EXTENDED RELEASE ORAL ONCE
Status: COMPLETED | OUTPATIENT
Start: 2025-04-16 | End: 2025-04-16

## 2025-04-16 RX ORDER — MUPIROCIN 20 MG/G
OINTMENT TOPICAL 2 TIMES DAILY
Status: DISCONTINUED | OUTPATIENT
Start: 2025-04-16 | End: 2025-04-17 | Stop reason: HOSPADM

## 2025-04-16 RX ORDER — POTASSIUM CHLORIDE 1500 MG/1
TABLET, EXTENDED RELEASE ORAL
Status: COMPLETED
Start: 2025-04-16 | End: 2025-04-16

## 2025-04-16 RX ORDER — POTASSIUM CHLORIDE 1500 MG/1
TABLET, EXTENDED RELEASE ORAL
Status: DISPENSED
Start: 2025-04-16 | End: 2025-04-17

## 2025-04-16 RX ORDER — POTASSIUM CHLORIDE 1500 MG/1
TABLET, EXTENDED RELEASE ORAL
Status: DISCONTINUED
Start: 2025-04-16 | End: 2025-04-17

## 2025-04-16 RX ADMIN — POTASSIUM CHLORIDE 20 MEQ: 1500 TABLET, EXTENDED RELEASE ORAL at 15:08

## 2025-04-16 RX ADMIN — AMIODARONE HYDROCHLORIDE 0.5 MG/MIN: 1.8 INJECTION, SOLUTION INTRAVENOUS at 08:19

## 2025-04-16 RX ADMIN — MUPIROCIN: 20 OINTMENT TOPICAL at 09:15

## 2025-04-16 RX ADMIN — AMIODARONE HYDROCHLORIDE 400 MG: 200 TABLET ORAL at 15:08

## 2025-04-16 RX ADMIN — MUPIROCIN: 20 OINTMENT TOPICAL at 21:36

## 2025-04-16 RX ADMIN — AMIODARONE HYDROCHLORIDE 400 MG: 200 TABLET ORAL at 09:23

## 2025-04-16 RX ADMIN — SACUBITRIL AND VALSARTAN 0.5 TABLET: 24; 26 TABLET, FILM COATED ORAL at 21:33

## 2025-04-16 RX ADMIN — AMIODARONE HYDROCHLORIDE 400 MG: 200 TABLET ORAL at 21:33

## 2025-04-16 RX ADMIN — RIVAROXABAN 20 MG: 20 TABLET, FILM COATED ORAL at 09:23

## 2025-04-16 RX ADMIN — POTASSIUM CHLORIDE 20 MEQ: 1500 TABLET, EXTENDED RELEASE ORAL at 18:53

## 2025-04-16 RX ADMIN — AMIODARONE HYDROCHLORIDE 0.5 MG/MIN: 1.8 INJECTION, SOLUTION INTRAVENOUS at 21:33

## 2025-04-16 ASSESSMENT — PAIN SCALES - GENERAL
PAINLEVEL_OUTOF10: 0

## 2025-04-16 NOTE — PLAN OF CARE
Problem: Discharge Planning  Goal: Discharge to home or other facility with appropriate resources  Outcome: Progressing  Flowsheets (Taken 4/15/2025 1830 by Nella Gaviria, RN)  Discharge to home or other facility with appropriate resources:   Identify barriers to discharge with patient and caregiver   Arrange for needed discharge resources and transportation as appropriate   Identify discharge learning needs (meds, wound care, etc)   Refer to discharge planning if patient needs post-hospital services based on physician order or complex needs related to functional status, cognitive ability or social support system     Problem: Chronic Conditions and Co-morbidities  Goal: Patient's chronic conditions and co-morbidity symptoms are monitored and maintained or improved  Outcome: Progressing     Problem: Safety - Adult  Goal: Free from fall injury  Outcome: Progressing

## 2025-04-16 NOTE — PROGRESS NOTES
[facility]   Heart Failure Daily Progress Note    Admit Date:  4/15/2025  HPI:    Chief Complaint   Patient presents with    Palpitations     Pt with palpitations and SOB started this AM.  Denies CP.  States apple watch told him he was in a.fib.  has a HX of A fib in 1/2025 and needed cardioversion at that time        Moose Davila is being followed for tachycardia induced cardiomyopathy. Presented with palpitations, 's in symptomatic afib.  Known office patient to me.     Interval history:off of dopamine  On amiodarone    remains in NSR since SARA w cardioversion; LVEF 25%    Subjective:  Mr. Davila denies any further palpitations     Objective:   /63   Pulse 79   Temp 98.2 °F (36.8 °C) (Oral)   Resp 19   Ht 1.651 m (5' 5\")   Wt 82.5 kg (181 lb 14.1 oz)   SpO2 99%   BMI 30.27 kg/m²     Intake/Output Summary (Last 24 hours) at 4/16/2025 1400  Last data filed at 4/16/2025 0900  Gross per 24 hour   Intake 1044.07 ml   Output 350 ml   Net 694.07 ml       NYHA: III    Physical Exam:  General:  Awake, alert, NAD  Skin:  Warm and dry  Neck:  JVD<8  Chest:  Clear to auscultation, no wheezes/rhonchi/rales  Cardiovascular:  RRR S1S2, no m/r/g   Abdomen:  Soft, nontender, +bowel sounds  Extremities:  No  bilateral lower extremity edema    Medications:    mupirocin   Each Nostril BID    sodium chloride flush  5-40 mL IntraVENous 2 times per day    [Held by provider] sacubitril-valsartan  1 tablet Oral BID    [Held by provider] metoprolol succinate  12.5 mg Oral Nightly    rivaroxaban  20 mg Oral Daily with breakfast    amiodarone  400 mg Oral TID      sodium chloride      amiodarone 0.5 mg/min (04/16/25 0819)       Lab Data:  CBC:   Recent Labs     04/15/25  1113 04/16/25  0423   WBC 6.2 9.9   HGB 16.2 14.0    262     BMP:    Recent Labs     04/15/25  1113 04/16/25  0423    140   K 3.5 3.6   CO2 24 25   BUN 14 11   CREATININE 1.1 1.0     INR:  No results for input(s): \"INR\" in the

## 2025-04-16 NOTE — CARE COORDINATION
Case Management Assessment  Initial Evaluation    Date/Time of Evaluation: 4/16/2025 10:07 AM  Assessment Completed by: Stephanie Flores RN    If patient is discharged prior to next notation, then this note serves as note for discharge by case management.    Patient Name: Moose Davila                   YOB: 1995  Diagnosis: Paroxysmal atrial fibrillation (HCC) [I48.0]  A-fib (HCC) [I48.91]                   Date / Time: 4/15/2025 10:59 AM    Patient Admission Status: Inpatient   Readmission Risk (Low < 19, Mod (19-27), High > 27): Readmission Risk Score: 6.4    Current PCP: No, Pcp  PCP verified by CM? Yes (none)    Chart Reviewed: Yes      History Provided by: Patient  Patient Orientation: Alert and Oriented    Patient Cognition: Alert    Hospitalization in the last 30 days (Readmission):  No    If yes, Readmission Assessment in CM Navigator will be completed.    Advance Directives:      Code Status: Full Code   Patient's Primary Decision Maker is: Legal Next of Kin    Primary Decision Maker (Active): Sal Davila [facility] - [phone]    Discharge Planning:    Patient lives with: Alone Type of Home: House  Primary Care Giver: Self  Patient Support Systems include: Family Members, Friends/Neighbors   Current Financial resources: None  Current community resources: None  Current services prior to admission: None            Current DME:              Type of Home Care services:  None    ADLS  Prior functional level: Independent in ADLs/IADLs  Current functional level: Independent in ADLs/IADLs    PT AM-PAC:   /24  OT AM-PAC:   /24    Family can provide assistance at DC: No  Would you like Case Management to discuss the discharge plan with any other family members/significant others, and if so, who? No  Plans to Return to Present Housing: Yes  Other Identified Issues/Barriers to RETURNING to current housing: none  Potential Assistance needed at discharge: N/A            Potential DME:  none  Patient expects  to discharge to: House  Plan for transportation at discharge: Self    Financial    Payor: [employer] [employer] / Plan: [employer] - CHOICE PLUS / Product Type: *No Product type* /     Does insurance require precert for SNF: Yes    Potential assistance Purchasing Medications: No  Meds-to-Beds request:        [facility] [id number] [place], [place] - [address] - P [phone] - F [phone] [address] [facility] [address]  Phone: [phone] Fax: [phone]      Notes:    Factors facilitating achievement of predicted outcomes: Motivated, Cooperative, and Pleasant    Barriers to discharge: Limited family support    Additional Case Management Notes: Pt is from house alone. Works full time. [employer]. Has no PCP. Ref. To resident clinic.     The Plan for Transition of Care is related to the following treatment goals of Paroxysmal atrial fibrillation (HCC) [I48.0]  A-fib (HCC) [I48.91]      The Patient and/or Patient Representative Agree with the Discharge Plan? Yes    Stephanie Flores RN  Case Management Department  Ph: [phone] Fax: [phone]

## 2025-04-16 NOTE — PROGRESS NOTES
[facility]     Electrophysiology                                     Progress Note    Admission date:  4/15/2025    Reason for follow up visit: Rapid atrial fibrillation    HPI/CC: Moose Davila was admitted on 4/15/2025 with palpitations, dizziness and shortness of breath.  Patient was found to be in rapid atrial fibrillation heart rate 180s.  EP consulted. SBP 60s.  Dopamine drip initiated.  Patient taken to Cath Lab for urgent SARA/DCCV.  IV and oral amiodarone initiated    Rhythm has been ST rate 104    Subjective: Patient denies chest pain shortness with and palpitations.  Patient states he is feeling improved today.  Will discontinue dopamine drip as patient is slightly tachycardic.  Will keep his amiodarone for another 24 hours.  Will have Vee Vieyra CNP for heart failure evaluate patient as she is known to have    Vitals:  Blood pressure 101/63, pulse 79, temperature 98.2 °F (36.8 °C), temperature source Oral, resp. rate 19, height 1.651 m (5' 5\"), weight 82.5 kg (181 lb 14.1 oz), SpO2 99%.  Temp  Av °F (36.7 °C)  Min: 97.8 °F (36.6 °C)  Max: 98.2 °F (36.8 °C)  Pulse  Av.3  Min: 67  Max: 112  BP  Min: 87/61  Max: 108/81  SpO2  Av.5 %  Min: 94 %  Max: 100 %    24 hour I/O    Intake/Output Summary (Last 24 hours) at 2025 1420  Last data filed at 2025 0900  Gross per 24 hour   Intake 1044.07 ml   Output 350 ml   Net 694.07 ml     Current Facility-Administered Medications   Medication Dose Route Frequency Provider Last Rate Last Admin    mupirocin (BACTROBAN) 2 % ointment   Each Nostril BID Robert Luo DO        potassium chloride (KLOR-CON M) extended release tablet 20 mEq  20 mEq Oral Once Allison Vieyra APRN - CNP        sodium chloride flush 0.9 % injection 5-40 mL  5-40 mL IntraVENous 2 times per day Robert Luo,         sodium chloride flush 0.9 % injection 5-40 mL  5-40 mL IntraVENous PRN Shadmand, Robert, DO        0.9 % sodium chloride infusion

## 2025-04-16 NOTE — H&P
[facility] Medicine History & Physical    V 1.6    Date of Admission: 4/15/2025    Date of Service:  Pt seen/examined on 4/15/2025    [x]Admitted to Inpatient with expected LOS greater than two midnights due to medical therapy.  []Placed in Observation status.    Chief Admission Complaint: Palpitations    Presenting Admission History:      29 y.o. male who presented to [facility] with palpitations.  PMHx significant for paroxysmal A-fib, tachycardia induced cardiomyopathy.      Patient had presented to ED due to palpitations and concern for going back to A-fib.  Patient had recently been cardioverted by cardiology.  Upon presentation to ED patient was found to be in A-fib with RVR and was hypotensive.  EP was emergently consulted and patient underwent SARA cardioversion and was started on amiodarone drip.  Given significant hypotension patient was also started on dopamine drip.  Patient is being admitted to ICU for close monitoring.  Upon my evaluation patient is in sinus rhythm and denies any new symptoms and patient is relatively hemodynamically stable on both amnio drip and dopamine drip.    Assessment/Plan:      Current Principal Problem:  A-fib (HCC)    Atrial Fibrillation with RVR- chronic paroxysmal, of unspecified and clinically unable to determine etiology.  Normally rate controlled on BBlocker - continued.  Anticoagulated at baseline on home Xarelto - continued.    -Status post repeat SARA cardioversion on 4/15/2025  - Per cardiology to continue amiodarone and dopamine drip.  - Full echocardiogram pending.    CHF - chronic systolic w/ reduced improved EF 45% by Echo dated January 2025.  Likely due to Non-ischemic heart disease.  Patient is euvolemic w/ no evidence of acute decompensation.  Continue current medical mgt.  Consider GDMT at discharge unless contraindicated.  (ACEi/ARB/ARNI, SGLT2i, Aldactone, BBlocker).      -Repeat limited echocardiogram pending.  - Holding GDMT given  FreeWallSt -26.3 % ESL 5.8 cm ESDbas 3.2 cm EDL 7.5 cm EDDbas 4.6 cm -----------------------------------------------------------------  QMAP ECV 2.2.56 #4 - RESEARCH ONLY / NOT FOR CLINICAL USE  ANALYSIS INFORMATION  T1 Correction factor 1   FITTING (ROIS) - T1 [MS] - SLICE 1  REGION ECV T1 NATIVE T1 POST LV Myocard 9.0 1,019.5 310.861 NOE 1 - - - NOE 2 - - - NOE 3 - - - NOE 4 - - - LV Blood Pool 60 1,552.7 64.511 ----------------------------------------------------------------- Overall findings are: - normal left ventricular size and mildly reduced systolic function with a calculated ejection fraction of 48% - normal wall thickness, normal LV mass, and normal wall motion - normal right ventricular size and normal systolic function with a calculated ejection fraction of 55% - The left atrium is normal in size - The right atrium is normal in size - No intracardiac shunt. Calculated Qp:Qs: 1.01 - Aortic valve: normal appearance, no stenosis, trivial regurgitation - Mitral valve: normal appearance, no stenosis, mild regurgitation, 10 ml volume, 13% fraction - Pulmonic valve: normal appearance, no stenosis, trivial regurgitation - Tricuspid valve: normal appearance, no stenosis, mild regurgitation, 10 ml volume, 13% fraction - Native myocardial T1 time is normal - ECV is normal - Resting myocardial perfusion is normal - LV strain is normal - RV strain is normal - There is no evidence of myocardial edema by T2w imaging - On delayed enhancement imaging, there is no abnormal hyperenhancement to suggest myocardial scar/inflammation/infiltration. Conclusion: Mildly reduced LVEF at 48%. Normal right ventricular size and function No evidence of edema, infarction, or infiltration. Findings most consistent with a non-specific, non-ischemic cardiomyopathy The MRI sequences and imaging planes in this study were tailored for cardiac imaging and are suboptimal for evaluation of non-cardiac structures      PCP: No, Pcp    Past

## 2025-04-16 NOTE — PROGRESS NOTES
[facility] Medicine Progress Note  V 1.6      Date of Admission: 4/15/2025    Hospital Day: 2      Chief Admission Complaint:  Palpitations     Subjective: No new symptoms today patient in sinus rhythm.    Presenting Admission History:       29 y.o. male who presented to [facility] with palpitations.  PMHx significant for paroxysmal A-fib, tachycardia induced cardiomyopathy.       Patient had presented to ED due to palpitations and concern for going back to A-fib.  Patient had recently been cardioverted by cardiology.  Upon presentation to ED patient was found to be in A-fib with RVR and was hypotensive.  EP was emergently consulted and patient underwent SARA cardioversion and was started on amiodarone drip.  Given significant hypotension patient was also started on dopamine drip.  Patient is being admitted to ICU for close monitoring.  Upon my evaluation patient is in sinus rhythm and denies any new symptoms and patient is relatively hemodynamically stable on both amnio drip and dopamine drip.    Assessment/Plan:      Current Principal Problem:  A-fib (HCC)    Atrial Fibrillation with RVR- chronic paroxysmal, of unspecified and clinically unable to determine etiology.  Normally rate controlled on BBlocker - continued.  Anticoagulated at baseline on home Xarelto - continued.     -Status post repeat SARA cardioversion on 4/15/2025  - Per cardiology to continue amiodarone   - Dopamine drip was stopped today.  - Patient will be a good candidate for ablation given young age and significant cardiomyopathy.       CHF - chronic systolic w/ reduced improved EF 45% by Echo dated January 2025.  Likely due to Non-ischemic heart disease.  Patient is euvolemic w/ no evidence of acute decompensation.  Continue current medical mgt.  Consider GDMT at discharge unless contraindicated.  (ACEi/ARB/ARNI, SGLT2i, Aldactone, BBlocker).       -Repeat limited echocardiogram shows EF of 15%.  - Holding GDMT given

## 2025-04-16 NOTE — PROGRESS NOTES
Shift: [id number]    Reason for ICU Admission: afib s/p SARA/Cardioversion    Most recent vitals: BP (!) 93/58   Pulse 67   Temp 97.8 °F (36.6 °C) (Oral)   Resp 15   Ht 1.651 m (5' 5\")   Wt 82.2 kg (181 lb 3.2 oz)   SpO2 98%   BMI 30.15 kg/m²      Drip rates at handoff:    sodium chloride      DOPamine 2 mcg/kg/min (04/16/25 0000)    amiodarone 0.5 mg/min (04/16/25 0000)       Current O2:   [x] Room Air   [] NC  L   [] BiPaP    [] Vented  % Fi02,  Peep    Hospital Course:  ICU Day # 1   Admitted to ICU post SARA/Cardioversion  Pt with long history of afib with previous cardioversion January, 2025  EF 10-15%   Started on Amiodarone 1 mg and Dopamine 2 mcg  Amiodarone 400 mg po given  Tolerating diet well   Nights  - No acute events overnight

## 2025-04-16 NOTE — PLAN OF CARE
CHF Care Plan      Patient's EF (Ejection Fraction) is less than 40%    Heart Failure Medications:  Diuretics:: None    (One of the following REQUIRED for EF </= 40%/SYSTOLIC FAILURE but MAY be used in EF% >40%/DIASTOLIC FAILURE)        ACE:: None        ARB:: None         ARNI:: Sacubitril/Valsartan-Entresto    (Beta Blockers)  NON- Evidenced Based Beta Blocker (for EF% >40%/DIASTOLIC FAILURE): None    Evidenced Based Beta Blocker::(REQUIRED for EF% <40%/SYSTOLIC FAILURE) None  ...................................................................................................................................................    Failed to redirect to the Timeline version of the [employer] SmartLink.      Patient's weights and intake/output reviewed    Daily Weight log at bedside, patient/family participation in use of log: \"yes    Patient's current weight today shows a difference of 0.88 lbs less than last documented weight.      Intake/Output Summary (Last 24 hours) at 4/16/2025 1838  Last data filed at 4/16/2025 1833  Gross per 24 hour   Intake 1912.94 ml   Output 800 ml   Net 1112.94 ml       Education Booklet Provided: yes    Comorbidities Reviewed Yes    Patient has a past medical history of Anxiety, Atrial fibrillation (HCC), Cardiac arrhythmia due to congenital heart disease, and CHF (congestive heart failure) (HCC).     >>For CHF and Comorbidity documentation on Education Time and Topics, please see Education Tab      CHF Education    Learners:  Patient  Readineess:   Acceptance  Method:   Explanation  Response:   Verbalizes Understanding  Comments:     Time Spent: 15 minutes      Pt sitting in bed at this time on room air. Pt denies shortness of breath. Pt without lower extremity edema.     Patient and/or Family's stated Goal of Care this Admission: reduce shortness of breath, increase activity tolerance, better understand heart failure and disease management, be more comfortable, and reduce lower extremity edema  prior to discharge        :

## 2025-04-17 VITALS
SYSTOLIC BLOOD PRESSURE: 114 MMHG | BODY MASS INDEX: 30.85 KG/M2 | TEMPERATURE: 97.9 F | RESPIRATION RATE: 16 BRPM | OXYGEN SATURATION: 100 % | HEART RATE: 95 BPM | HEIGHT: 65 IN | WEIGHT: 185.19 LBS | DIASTOLIC BLOOD PRESSURE: 65 MMHG

## 2025-04-17 PROCEDURE — 6370000000 HC RX 637 (ALT 250 FOR IP): Performed by: INTERNAL MEDICINE

## 2025-04-17 PROCEDURE — 2500000003 HC RX 250 WO HCPCS

## 2025-04-17 PROCEDURE — 6370000000 HC RX 637 (ALT 250 FOR IP): Performed by: NURSE PRACTITIONER

## 2025-04-17 PROCEDURE — 99233 SBSQ HOSP IP/OBS HIGH 50: CPT

## 2025-04-17 RX ORDER — AMIODARONE HYDROCHLORIDE 200 MG/1
200 TABLET ORAL DAILY
Qty: 30 TABLET | Refills: 3 | Status: SHIPPED | OUTPATIENT
Start: 2025-05-08

## 2025-04-17 RX ORDER — AMIODARONE HYDROCHLORIDE 200 MG/1
200 TABLET ORAL DAILY
Status: DISCONTINUED | OUTPATIENT
Start: 2025-05-08 | End: 2025-04-17 | Stop reason: HOSPADM

## 2025-04-17 RX ORDER — AMIODARONE HYDROCHLORIDE 200 MG/1
200 TABLET ORAL 2 TIMES DAILY
Qty: 42 TABLET | Refills: 0 | Status: SHIPPED | OUTPATIENT
Start: 2025-04-17 | End: 2025-04-24

## 2025-04-17 RX ORDER — METOPROLOL SUCCINATE 25 MG/1
12.5 TABLET, EXTENDED RELEASE ORAL NIGHTLY
Status: DISCONTINUED | OUTPATIENT
Start: 2025-04-17 | End: 2025-04-17 | Stop reason: HOSPADM

## 2025-04-17 RX ORDER — AMIODARONE HYDROCHLORIDE 200 MG/1
200 TABLET ORAL 2 TIMES DAILY
Status: DISCONTINUED | OUTPATIENT
Start: 2025-04-17 | End: 2025-04-17 | Stop reason: HOSPADM

## 2025-04-17 RX ADMIN — SACUBITRIL AND VALSARTAN 0.5 TABLET: 24; 26 TABLET, FILM COATED ORAL at 09:27

## 2025-04-17 RX ADMIN — RIVAROXABAN 20 MG: 20 TABLET, FILM COATED ORAL at 09:27

## 2025-04-17 RX ADMIN — AMIODARONE HYDROCHLORIDE 400 MG: 200 TABLET ORAL at 09:27

## 2025-04-17 RX ADMIN — MUPIROCIN: 20 OINTMENT TOPICAL at 09:28

## 2025-04-17 RX ADMIN — Medication 10 ML: at 09:27

## 2025-04-17 NOTE — PROGRESS NOTES
[facility]     Electrophysiology                                     Progress Note    Admission date:  4/15/2025    Reason for follow up visit: Rapid atrial fibrillation    HPI/CC: Moose Davila was admitted on 4/15/2025 with palpitations, dizziness and shortness of breath.  Patient was found to be in rapid atrial fibrillation heart rate 180s.  EP consulted. SBP 60s.  Dopamine drip initiated.  Patient taken to Cath Lab for urgent SARA/DCCV.  IV and oral amiodarone initiated    Rhythm has been SR/ST     Subjective: Patient denies chest pain shortness with and palpitations.  Patient states he is feeling improved today. Will stop amiodarone gtt. Likely ok for discharge as pt would like to leave    Vitals:  Blood pressure 110/65, pulse 77, temperature 97.9 °F (36.6 °C), temperature source Oral, resp. rate 18, height 1.651 m (5' 5\"), weight 84 kg (185 lb 3 oz), SpO2 100%.  Temp  Av.8 °F (37.1 °C)  Min: 97.9 °F (36.6 °C)  Max: 99.6 °F (37.6 °C)  Pulse  Av.1  Min: 65  Max: 112  BP  Min: 85/56  Max: 113/71  SpO2  Av.6 %  Min: 97 %  Max: 100 %    24 hour I/O    Intake/Output Summary (Last 24 hours) at 2025 0936  Last data filed at 2025 0400  Gross per 24 hour   Intake 1636.31 ml   Output 450 ml   Net 1186.31 ml     Current Facility-Administered Medications   Medication Dose Route Frequency Provider Last Rate Last Admin    metoprolol succinate (TOPROL XL) extended release tablet 12.5 mg  12.5 mg Oral Nightly Eboni Smith APRN - CNP        amiodarone (CORDARONE) tablet 200 mg  200 mg Oral BID Eboni Smith APRN - CNP        [START ON 2025] amiodarone (CORDARONE) tablet 200 mg  200 mg Oral Daily Eboni Smith APRN - CNP        mupirocin (BACTROBAN) 2 % ointment   Each Nostril BID Robert Luo DO   Given at 25 0928    sacubitril-valsartan (ENTRESTO) 24-26 MG per tablet 0.5 tablet  0.5 tablet Oral BID Allison Vierya APRN - CNP   0.5 tablet at 25 0958

## 2025-04-17 NOTE — CARE COORDINATION
CASE MANAGEMENT DISCHARGE SUMMARY      Discharge to: home, no needs       IMM given: (date) NA       Transportation:    Family/car:    s    Confirmed discharge plan with:     Patient: yes      Family:   no- pt notified        RN, name: Chacorta

## 2025-04-17 NOTE — PROGRESS NOTES
Shift: [id number]    Reason for ICU Admission: afib s/p SARA/Cardioversion    Most recent vitals: /65   Pulse 77   Temp 97.9 °F (36.6 °C) (Oral)   Resp 18   Ht 1.651 m (5' 5\")   Wt 82.1 kg (181 lb)   SpO2 100%   BMI 30.12 kg/m²      Drip rates at handoff:    sodium chloride      amiodarone 0.5 mg/min (04/17/25 0024)       Current O2:   [x] Room Air   [] NC  L   [] BiPaP    [] Vented  % Fi02,  Peep    Hospital Course:  ICU Day # 1   Admitted to ICU post SARA/Cardioversion  Pt with long history of afib with previous cardioversion January, 2025  EF 10-15%   Started on Amiodarone 1 mg and Dopamine 2 mcg  Amiodarone 400 mg po given  Tolerating diet well   Nights  - No acute events overnight    ICU Day #2 4/16/25  Nights  - No acute events overnight

## 2025-04-17 NOTE — PLAN OF CARE
CHF Care Plan      Patient's EF (Ejection Fraction) is greater than 40%    Heart Failure Medications:  Diuretics:: None    (One of the following REQUIRED for EF </= 40%/SYSTOLIC FAILURE but MAY be used in EF% >40%/DIASTOLIC FAILURE)        ACE:: None        ARB:: None         ARNI:: Sacubitril/Valsartan-Entresto    (Beta Blockers)  NON- Evidenced Based Beta Blocker (for EF% >40%/DIASTOLIC FAILURE): None    Evidenced Based Beta Blocker::(REQUIRED for EF% <40%/SYSTOLIC FAILURE) None  ...................................................................................................................................................    Failed to redirect to the Timeline version of the [employer] SmartLink.      Patient's weights and intake/output reviewed    Daily Weight log at bedside, patient/family participation in use of log: \"yes    Patient's current weight today shows a difference of 3.375 lbs more than last documented weight.      Intake/Output Summary (Last 24 hours) at 4/17/2025 0744  Last data filed at 4/17/2025 0400  Gross per 24 hour   Intake 1836.31 ml   Output 450 ml   Net 1386.31 ml       Education Booklet Provided: yes    Comorbidities Reviewed Yes    Patient has a past medical history of Anxiety, Atrial fibrillation (HCC), Cardiac arrhythmia due to congenital heart disease, and CHF (congestive heart failure) (HCC).     >>For CHF and Comorbidity documentation on Education Time and Topics, please see Education Tab      CHF Education    Learners:  Patient  Readineess:   Acceptance  Method:   Explanation  Response:   Verbalizes Understanding  Comments:     Time Spent: 15 minutes      Pt sitting in bed at this time on room air. Pt denies shortness of breath. Pt without lower extremity edema.     Patient and/or Family's stated Goal of Care this Admission: reduce shortness of breath, increase activity tolerance, better understand heart failure and disease management, be more comfortable, and reduce lower extremity  edema prior to discharge        :

## 2025-04-17 NOTE — DISCHARGE SUMMARY
Hospital Medicine Discharge Summary    Patient: Moose Davila   : 1995     Hospital:  [facility]  Admit Date: 4/15/2025   Discharge Date: 2025    Disposition:  Home   Code status:  Full  Condition at Discharge: Stable  Primary Care Provider: Nathaly Gayle    Admitting Provider: Robert Luo DO  Discharge Provider: Robert Luo DO     Discharge Diagnoses:      Active Hospital Problems    Diagnosis     Rapid atrial fibrillation (HCC) [I48.91]      Priority: High    A-fib (HCC) [I48.91]      Priority: High    Dilated cardiomyopathy (HCC) [I42.0]     HFrEF (heart failure with reduced ejection fraction) (HCC) [I50.20]     Encounter for monitoring amiodarone therapy [Z51.81, Z79.899]     Hypotension [I95.9]     Acute systolic heart failure (HCC) [I50.21]     Tachycardia induced cardiomyopathy [I42.8]        Presenting Admission History:      29 y.o. male who presented to [facility] with palpitations.  PMHx significant for paroxysmal A-fib, tachycardia induced cardiomyopathy.       Patient had presented to ED due to palpitations and concern for going back to A-fib.  Patient had recently been cardioverted by cardiology.  Upon presentation to ED patient was found to be in A-fib with RVR and was hypotensive.  EP was emergently consulted and patient underwent SARA cardioversion and was started on amiodarone drip.  Given significant hypotension patient was also started on dopamine drip.  Patient is being admitted to ICU for close monitoring.  Upon my evaluation patient is in sinus rhythm and denies any new symptoms and patient is relatively hemodynamically stable on both amnio drip and dopamine drip.     Assessment/Plan:      Patient presented with atrial fibrillation with RVR with hypotension for which was urgently taken by cardiology for SARA cardioversion which was successful.  Post cardioversion given persistent hypotension and low EF of 15% patient was admitted to ICU for

## 2025-04-17 NOTE — PLAN OF CARE
Problem: Discharge Planning  Goal: Discharge to home or other facility with appropriate resources  Outcome: Progressing  Flowsheets (Taken 4/16/2025 1200 by Nella Gaviria, RN)  Discharge to home or other facility with appropriate resources:   Identify barriers to discharge with patient and caregiver   Arrange for needed discharge resources and transportation as appropriate   Identify discharge learning needs (meds, wound care, etc)   Refer to discharge planning if patient needs post-hospital services based on physician order or complex needs related to functional status, cognitive ability or social support system     Problem: Chronic Conditions and Co-morbidities  Goal: Patient's chronic conditions and co-morbidity symptoms are monitored and maintained or improved  Outcome: Progressing  Flowsheets (Taken 4/16/2025 1200 by Nella Gaviria, RN)  Care Plan - Patient's Chronic Conditions and Co-Morbidity Symptoms are Monitored and Maintained or Improved: Monitor and assess patient's chronic conditions and comorbid symptoms for stability, deterioration, or improvement     Problem: Safety - Adult  Goal: Free from fall injury  Outcome: Progressing  Flowsheets (Taken 4/16/2025 1117 by Nella Gaviria, RN)  Free From Fall Injury: Instruct family/caregiver on patient safety

## 2025-04-18 NOTE — PROGRESS NOTES
Physician Progress Note      PATIENT:               ALEJANDRO AYON  CSN #:                  [id number]  :                       1995  ADMIT DATE:       4/15/2025 10:59 AM  DISCH DATE:        2025 2:30 PM  RESPONDING  PROVIDER #:        Natalia Sheikh DO          QUERY TEXT:    Based on your medical judgment, please clarify these findings and document if   any of the following are being evaluated and/or treated:    The clinical indicators include:  29 y.o. male with PMH of atrial fibrillation, ASD, sinus tachycardia,   palpitations, anxiety, depression    4/15 Cardiology H/P \"Patient is very symptomatic and hemodynamically unstable   while in the arrhythmia\"    4/15 Critical Care H/P \"Status post repeat SARA cardioversion on 4/15/2025- Per   cardiology to continue amiodarone and dopamine drip\"     Electrophysiology note \"rapid atrial fibrillation heart rate 180s...SBP   60s.  Dopamine drip initiated...urgent SARA/DCCV.  IV and oral amiodarone   initiated\"     Cardiology Note \"Acute heart failure with reduced EF...Tachycardia   induced cardiomyopathy...Afib with RVR- \"      SARA with cardioversion, IV Dopamine gtt, IV Amiodarone transitioned to po   amiodarone, NS bolus 1000ml x 1 ICU and supportive, EPS consult, Limited ECHO  Options provided:  -- Cardiogenic Shock  -- Other - I will add my own diagnosis  -- Disagree - Not applicable / Not valid  -- Disagree - Clinically unable to determine / Unknown  -- Refer to Clinical Documentation Reviewer    PROVIDER RESPONSE TEXT:    This patient was treated for cardiogenic shock.    Query created by: Eboni Hdz on 2025 3:53 PM      Electronically signed by:  Natalia Sheikh DO 2025 2:55 PM

## 2025-04-22 PROBLEM — I48.0 PAF (PAROXYSMAL ATRIAL FIBRILLATION) (HCC): Status: ACTIVE | Noted: 2025-04-11

## 2025-04-22 NOTE — TELEPHONE ENCOUNTER
Procedure:  Loop Implant  Doctor:  Dr. Sheikh  Date:  5/16/25  Time:  1pm  Arrival:  12pm  Reps:  [employer]  Anesthesia:  n/a      Spoke with patient. Please have patient arrive to the main entrance of [facility] ([address] [place] [id number]) and check in with the registration desk.  They will be directed to the Cath Lab.  Do not apply lotions/creams on skin the day of procedure.

## 2025-04-24 ENCOUNTER — OFFICE VISIT (OUTPATIENT)
Dept: CARDIOLOGY CLINIC | Age: 30
End: 2025-04-24

## 2025-04-24 VITALS
HEIGHT: 65 IN | HEART RATE: 77 BPM | SYSTOLIC BLOOD PRESSURE: 100 MMHG | DIASTOLIC BLOOD PRESSURE: 66 MMHG | WEIGHT: 183 LBS | OXYGEN SATURATION: 99 % | BODY MASS INDEX: 30.49 KG/M2

## 2025-04-24 DIAGNOSIS — I48.0 PAF (PAROXYSMAL ATRIAL FIBRILLATION) (HCC): ICD-10-CM

## 2025-04-24 DIAGNOSIS — I42.8 TACHYCARDIA INDUCED CARDIOMYOPATHY (HCC): ICD-10-CM

## 2025-04-24 DIAGNOSIS — Z09 HOSPITAL DISCHARGE FOLLOW-UP: Primary | ICD-10-CM

## 2025-04-24 NOTE — PATIENT INSTRUCTIONS
Continue Entresto 1/2 tab of the 24/26mg Twice a day  Continue toprol current dose 1/2 tab   Monitor rash- okay to take benadryl if needed; call the office if not improving   Follow up with EP as planned for loop recorder implant   Follow up in 8 weeks or sooner needed

## 2025-04-24 NOTE — PROGRESS NOTES
and normal wall motion  - normal right ventricular size and normal systolic function with a calculated ejection fraction of 55%  - The left atrium is normal in size  - The right atrium is normal in size  - No intracardiac shunt. Calculated Qp:Qs: 1.01  - Aortic valve: normal appearance, no stenosis, trivial regurgitation  - Mitral valve: normal appearance, no stenosis, mild regurgitation, 10 ml volume, 13% fraction  - Pulmonic valve: normal appearance, no stenosis, trivial regurgitation  - Tricuspid valve: normal appearance, no stenosis, mild regurgitation, 10 ml volume, 13% fraction  - Native myocardial T1 time is normal  - ECV is normal  - Resting myocardial perfusion is normal  - LV strain is normal  - RV strain is normal  - There is no evidence of myocardial edema by T2w imaging  - On delayed enhancement imaging, there is no abnormal hyperenhancement to suggest myocardial scar/inflammation/infiltration.     Conclusion:  Mildly reduced LVEF at 48%. Normal right ventricular size and function  No evidence of edema, infarction, or infiltration. Findings most consistent with a non-specific, non-ischemic cardiomyopathy    SARA/DCCV 4/15/2025    Left Ventricle: Severely reduced left ventricular systolic function with a visually estimated EF of 15 - 20%. EF by 2D Simpsons Biplane is 22%. Left ventricle is severely dilated. Severe global hypokinesis present.    Left Atrium: Normal sized windsock appendage. Normal appendage flow velocity. No left atrial appendage thrombus noted. No left atrial appendage mass noted.    Image quality is adequate.    Echo 4/16/2025    Image quality is adequate.    Left Ventricle: Mildly reduced left ventricular systolic function with a visually estimated EF of 45 - 50%. Left ventricle size is normal. Normal wall thickness. Mild global hypokinesis present. Global longitudinal strain is -11.6%.      Core measures for HFrEF:  EF: 25-45%   ICD: NA needs GDMT   ACEi/ARB/ARNI: sacubitril/valsartan

## 2025-04-29 ENCOUNTER — TELEPHONE (OUTPATIENT)
Dept: CARDIOLOGY CLINIC | Age: 30
End: 2025-04-29

## 2025-04-29 DIAGNOSIS — I50.22 CHRONIC SYSTOLIC HEART FAILURE (HCC): ICD-10-CM

## 2025-04-29 DIAGNOSIS — I42.8 TACHYCARDIA INDUCED CARDIOMYOPATHY (HCC): ICD-10-CM

## 2025-04-29 DIAGNOSIS — I48.19 PERSISTENT ATRIAL FIBRILLATION (HCC): ICD-10-CM

## 2025-04-29 DIAGNOSIS — Z92.89 HISTORY OF CARDIOVERSION: ICD-10-CM

## 2025-04-29 RX ORDER — METOPROLOL SUCCINATE 25 MG/1
12.5 TABLET, EXTENDED RELEASE ORAL 2 TIMES DAILY
Qty: 90 TABLET | Refills: 3 | Status: SHIPPED | OUTPATIENT
Start: 2025-04-29

## 2025-05-05 RX ORDER — AMIODARONE HYDROCHLORIDE 200 MG/1
200 TABLET ORAL DAILY
Qty: 30 TABLET | Refills: 3 | Status: SHIPPED | OUTPATIENT
Start: 2025-05-08

## 2025-05-05 NOTE — TELEPHONE ENCOUNTER
Last Office Visit: 04/11/25 Provider: VERÓNICA  **Is provider OOT? No    Next Office Visit: 7/10/2025 Provider: VERÓNICA  **If no OV, when does pt need to be seen? NA   **Has patient already had 30 day supply? No    Lab orders needed? no   Encounter provider correct? Yes If not, change provider  Script changes since last refill? no    LAST LABS:   BMP:  Lab Results   Component Value Date/Time     04/16/2025 04:23 AM    K 3.6 04/16/2025 04:23 AM     04/16/2025 04:23 AM    CO2 25 04/16/2025 04:23 AM    BUN 11 04/16/2025 04:23 AM    CREATININE 1.0 04/16/2025 04:23 AM    GLUCOSE 101 04/16/2025 04:23 AM    CALCIUM 9.0 04/16/2025 04:23 AM    LABGLOM >90 04/16/2025 04:23 AM      Liver:  Lab Results   Component Value Date    ALT 37 04/16/2025    AST 21 04/16/2025    ALKPHOS 47 04/16/2025    BILITOT 0.9 04/16/2025     EKG:  Encounter Date: 04/15/25   EKG 12 Lead   Result Value    Ventricular Rate 111    Atrial Rate 111    P-R Interval 190    QRS Duration 102    Q-T Interval 346    QTc Calculation (Bazett) 470    P Axis 54    R Axis 52    T Axis 35    Diagnosis      Sinus tachycardiaWhen compared with ECG of 15-APR-2025 11:01,Sinus rhythm has replaced Atrial fibrillationVent. rate has decreased BY  72 BPMNon-specific change in ST segment in Inferior leadsST no longer depressed in Lateral leadsConfirmed by CHAVO NATARAJAN ([id number]) on 4/15/2025 10:40:54 PM       TSH:  Lab Results   Component Value Date    TSH 1.27 09/18/2017    TSHFT4 2.46 01/07/2025       **Care Everywhere? no

## 2025-05-05 NOTE — TELEPHONE ENCOUNTER
Refill    amiodarone (CORDARONE) 200 MG tablet     [facility] [phone] - [facility] [address] - [place] [phone] - F [phone]

## 2025-05-13 NOTE — H&P
CARDIAC ELECTROPHYSIOLOGY CONSULT NOTE  Patient Name: Moose Davila  YOB: 1995    Primary Care Physician: Nalini, Pcp    CHIEF COMPLAINT:     Moose Davila was seen today on 5/16/2025 in consultation due to chief complaint of atrial fibrillation.    Patient is a 29 y.o. male with PMH of atrial fibrillation, ASD, sinus tachycardia, palpitations, anxiety, depression who was seen today for atrial fibrillation.     In 1/2025 patient presented to the ED with complaints of palpitations. Upon presentation to the ED his heart rate was 200bpm. Vagal maneuvers were attempted which decreased the heart rate to the 150s transiently. He also received adenosine with no change in arrhythmia. He was started on diltiazem drip. On 1/7/2025 he underwent successful SARA/CV, SARA showed an LVEF of 25-30%. Echo 1/8/2025 demonstrated an LVEF of 45-50%. Patient was discharged wearing 30 day cardiac event monitor and with recommendation for follow up with heart failure team as outpatient.     Patient wore a cardiac event monitor from 1/8/2025 to 2/7/2025 which demonstrated predominately SR with an average HR of 76 () BPM, very rare PACs and PVCs. Cardiac MRI 3/24/2025 demonstrated an LVEF of 48%, no evidence of edema, infarction, or infiltration. Findings most consistent with a non-specific, non-ischemic cardiomyopathy.      Patient had a SARA cardioversion on 4/15/2025.  Given tachycardia and his cardiomyopathy, we started patient on dopamine given significant hypotension and admitted him for hemodynamic instability.  Amiodarone was also started.    Today he presents for loop recorder insertion.     ALLERGIES:   Allergies   Allergen Reactions    Amoxicillin Rash        MEDICATIONS:   No current facility-administered medications for this encounter.        PMH:   Past Medical History:   Diagnosis Date    Anxiety     Atrial fibrillation (HCC)     Cardiac arrhythmia due to congenital heart disease     CHF (congestive heart failure)  Connections: Not on file   Intimate Partner Violence: Not on file   Housing Stability: Low Risk  (1/7/2025)    Housing Stability Vital Sign     Unable to Pay for Housing in the Last Year: No     Number of Times Moved in the Last Year: 0     Homeless in the Last Year: No        ROS: Review Of Systems:  Review of Systems   Constitutional:  Negative for chills and fever.   HENT: Negative for sore throat.    Eyes: Negative for redness.   Respiratory: Negative for hemoptysis and shortness of breath.    Cardiovascular: Negative for chest pain and + occasional palpitations.   Gastrointestinal: Negative for blood in stool and melena.   Genitourinary: Negative for hematuria.   Musculoskeletal: Negative for falls.   Skin: Negative for rash.   Neurological: Negative loss of consciousness.   Endo/Heme/Allergies: Does not  bleed easily.   Psychiatric/Behavioral: Negative for memory loss.     Physical Exam   Constitutional: Appears well-developed.    Head: Normocephalic.   Eyes: Pupils are equal  Neck: Normal range of motion.   Cardiovascular: Normal rate and irregular rhythm.    Pulmonary/Chest: Effort normal.   Abdominal: Soft.   Musculoskeletal: No pedal edema.   Neurological: Alert and oriented to person, place, and time.   Skin: Skin is dry. No erythema.   Psychiatric: Normal mood and affect.          LABS  CBC:   Lab Results   Component Value Date/Time    WBC 9.9 04/16/2025 04:23 AM    RBC 4.38 04/16/2025 04:23 AM    HGB 14.0 04/16/2025 04:23 AM    HCT 39.3 04/16/2025 04:23 AM    MCV 89.6 04/16/2025 04:23 AM    MCH 32.0 04/16/2025 04:23 AM    MCHC 35.7 04/16/2025 04:23 AM    RDW 13.6 04/16/2025 04:23 AM     04/16/2025 04:23 AM    MPV 7.6 04/16/2025 04:23 AM      Lab Results   Component Value Date/Time     04/16/2025 04:23 AM    K 3.6 04/16/2025 04:23 AM     04/16/2025 04:23 AM    CO2 25 04/16/2025 04:23 AM    BUN 11 04/16/2025 04:23 AM    CREATININE 1.0 04/16/2025 04:23 AM    GLUCOSE 101 04/16/2025 04:23

## 2025-05-16 ENCOUNTER — CLINICAL SUPPORT (OUTPATIENT)
Dept: CARDIOLOGY CLINIC | Age: 30
End: 2025-05-16

## 2025-05-16 ENCOUNTER — HOSPITAL ENCOUNTER (OUTPATIENT)
Age: 30
Discharge: HOME | End: 2025-05-16
Attending: INTERNAL MEDICINE | Admitting: INTERNAL MEDICINE
Payer: COMMERCIAL

## 2025-05-16 VITALS
DIASTOLIC BLOOD PRESSURE: 63 MMHG | WEIGHT: 177 LBS | HEIGHT: 65 IN | BODY MASS INDEX: 29.49 KG/M2 | SYSTOLIC BLOOD PRESSURE: 108 MMHG | OXYGEN SATURATION: 100 % | HEART RATE: 59 BPM

## 2025-05-16 DIAGNOSIS — I48.0 PAF (PAROXYSMAL ATRIAL FIBRILLATION) (HCC): ICD-10-CM

## 2025-05-16 DIAGNOSIS — Z45.09 ENCOUNTER FOR LOOP RECORDER CHECK: Primary | ICD-10-CM

## 2025-05-16 PROBLEM — I48.19 PERSISTENT ATRIAL FIBRILLATION WITH RAPID VENTRICULAR RESPONSE (HCC): Status: ACTIVE | Noted: 2025-04-15

## 2025-05-16 LAB — ECHO BSA: 1.92 M2

## 2025-05-16 PROCEDURE — 33285 INSJ SUBQ CAR RHYTHM MNTR: CPT | Performed by: INTERNAL MEDICINE

## 2025-05-16 PROCEDURE — C1764 EVENT RECORDER, CARDIAC: HCPCS | Performed by: INTERNAL MEDICINE

## 2025-05-16 PROCEDURE — 7100000010 HC PHASE II RECOVERY - FIRST 15 MIN: Performed by: INTERNAL MEDICINE

## 2025-05-16 PROCEDURE — 7100000011 HC PHASE II RECOVERY - ADDTL 15 MIN: Performed by: INTERNAL MEDICINE

## 2025-05-16 PROCEDURE — 6360000002 HC RX W HCPCS: Performed by: INTERNAL MEDICINE

## 2025-05-16 PROCEDURE — 2709999900 HC NON-CHARGEABLE SUPPLY: Performed by: INTERNAL MEDICINE

## 2025-05-16 DEVICE — ICM LNQ22 LINQ II PRIME US
Type: IMPLANTABLE DEVICE | Status: FUNCTIONAL
Brand: LINQ II™

## 2025-05-16 RX ORDER — LIDOCAINE HYDROCHLORIDE 20 MG/ML
INJECTION, SOLUTION EPIDURAL; INFILTRATION; INTRACAUDAL; PERINEURAL PRN
Status: DISCONTINUED | OUTPATIENT
Start: 2025-05-16 | End: 2025-05-16 | Stop reason: HOSPADM

## 2025-05-16 RX ORDER — BUPIVACAINE HYDROCHLORIDE 5 MG/ML
INJECTION, SOLUTION EPIDURAL; INTRACAUDAL; PERINEURAL PRN
Status: DISCONTINUED | OUTPATIENT
Start: 2025-05-16 | End: 2025-05-16 | Stop reason: HOSPADM

## 2025-05-16 NOTE — DISCHARGE INSTRUCTIONS
Cath Labs at [facility]           Loop Recorder Discharge Instructions     5/16/2025  Moose Davila   Date of Birth 1995       You will have a temporary ID card to keep in your wallet until you get a permanent card in the mail in 3 weeks.  This is to use  when you go through metal detectors in government buildings or airports or if a provider ask if you have an implantable device.   Patient Manual: is used for a reference. If you have any questions you can look at the manual or call the phone number provided on the manual     Patient Activator Remote:   Keep the remote near you at all times.  If you have symptoms press the heart button and place the remote over the device.  You will hear a beep and see a white checkmark light up beneath the heart button.  The remote will turn off on its own.     Incision:   Keep site clean and dry until seen in office, remove the outer dressing in 48 hours.  Call your provider for any signs or symptoms of infection such as: fever, drainage or swelling     Sedation Discharge Instructions:  For the next 24 hours do not drive a car, operate machinery, power tools or kitchen appliances.  Do not drink alcohol; including beer or wine.  Do not make any important decisions or sign any important papers.  For the next 24 hours you can expect drowsiness, light-headed or dizziness, nausea/ vomiting, inability to concentrate, fatigue and desire to sleep.  We strongly suggest that a responsible adult be with for the next 24 hours, for your protection and safety.  You are not allowed to drive yourself home, or take any type of public transportation.  If any questions, please call your doctor.  If you cannot reach your Doctor then call the Emergency Department at  [phone].  Explain to the Emergency Department the procedure you had performed and they will be able to assist you.  If you seek Emergency Care, bring this form with you.

## 2025-05-16 NOTE — PROCEDURES
PROCEDURE NOTE  Date: 5/16/2025   Name: Moose Davila  YOB: 1995    Procedures        Loop insertion  Moose Davila  1995  [id number]    Procedure: [employer] loop recorder implantation     Attending Physician:Natalia Sheikh    Sedation mode and time: Local anesthesia     Indication:  Atrial fibrillation with RVR  Tachycardia mediated cardiomyopathy      Patient History:  29 y.o. male with PMH of atrial fibrillation, ASD, sinus tachycardia, palpitations, anxiety, depression who was seen today for atrial fibrillation.      In 1/2025 patient presented to the ED with complaints of palpitations. Upon presentation to the ED his heart rate was 200bpm. Vagal maneuvers were attempted which decreased the heart rate to the 150s transiently. He also received adenosine with no change in arrhythmia. He was started on diltiazem drip. On 1/7/2025 he underwent successful SARA/CV, SARA showed an LVEF of 25-30%. Echo 1/8/2025 demonstrated an LVEF of 45-50%. Patient was discharged wearing 30 day cardiac event monitor and with recommendation for follow up with heart failure team as outpatient.      Patient wore a cardiac event monitor from 1/8/2025 to 2/7/2025 which demonstrated predominately SR with an average HR of 76 () BPM, very rare PACs and PVCs. Cardiac MRI 3/24/2025 demonstrated an LVEF of 48%, no evidence of edema, infarction, or infiltration. Findings most consistent with a non-specific, non-ischemic cardiomyopathy.      Patient had a SARA cardioversion on 4/15/2025.  Given tachycardia and his cardiomyopathy, we started patient on dopamine given significant hypotension and admitted him for hemodynamic instability.  Amiodarone was also started.     Today he presents for loop recorder insertion.       Past Medical History  Past Medical History:   Diagnosis Date    Anxiety     Atrial fibrillation (HCC)     Cardiac arrhythmia due to congenital heart disease     CHF (congestive heart failure) ([facility])         Family  the procedure were discussed with the patient, and the patient signed informed consent. The patient was studied in a stable fasting state.  Heart rate, blood pressure, and oxygen saturation were monitored throughout the procedure.  The patient was prepped and draped in the usual sterile fashion.  Time out was performed.      After antibiotic was completely infused, bupivacaine  was used for local anesthesia left parasternal fifth intercostal pectoral region.       Using a scalpel, a half centimeter incision was made in the left fifth intercostal left parasternal region.  A prepectoral pocket was formed by blunt dissection with the tool provided by the company followed by deployment of the device.     Hemostasis was verified, and the pocket was closed with 4.0 absorbable suture followed by Dermabond, steri-strips, a gauze dressing, and a Tegaderm dressing on the operative site were properly placed.     The patient was transferred to the pre/post room in stable condition.       There was 2 mL of blood loss. There were no complications. Fluoroscopic usage was none.   ------------------------------------------------------------------------------------------------        Findings:  Company: [employer] Serial number: [id number] Model number LNQ22.     Recommendations-  -Patient instructed to:  -Keep wound completely dry for 7-10 days; no showers.  -Not use hotubs or baths for 10 days.   -Follow-up in device clinic within 1-2 weeks for wound check/device check and with me in 3 months.  -Postoperative care as well as follow-up instructions were explained to the patient in detail.  ------      Natalia Sheikh, DO  Clinical Cardiac Electrophysiology  05/16/25  2:15 PM    [facility] [employer]   [phone] [facility]   [phone] [facility]

## 2025-05-16 NOTE — PROGRESS NOTES
DEVICE MODEL  LNQ22 LINQ II™  DEVICE SERIAL NUMBER  [id number]  DEVICE TYPE  ICM  DATE OF IMPLANT  16-May-2025  Monitor Information  MONITOR STATUS  Distributed to patient  DISTRIBUTION METHOD  Distributed from clinic inventory  DISTRIBUTION DATE  16-May-2025  MONITOR SERIAL NUMBER  [id number]  MONITOR MODEL  [employer]ay™ [id number]

## 2025-05-28 ENCOUNTER — CLINICAL SUPPORT (OUTPATIENT)
Dept: CARDIOLOGY CLINIC | Age: 30
End: 2025-05-28

## 2025-05-28 NOTE — PATIENT INSTRUCTIONS
Leave incision open to the air; do not apply any dressings, ointments, or bandages to the area. Do not apply lotion, perfume/cologne, or powders to the area until it is completely healed.   Any scabbing or skin glue that is noted will fall off within 1-2 weeks after the post op appointment.   If any oozing, bleeding, or pus drainage occurs, please call the office immediately at [phone].       Remote Monitoring Instructions    Within 2-3 weeks of your device being implanted, you will receive a call from the  of your device. Please answer this call as it is to set up remote monitoring for your device. Once you receive your in-home monitor, please follow the instructions provided to sync the home monitor to your implanted device. Once you have paired your home monitor to your implanted device, keep your monitor plugged in within 6 feet of where you sleep. Your monitor will routinely check in with your device during sleep hours and transmit any urgent events to the Device Clinic for review.     Please do not send additional routine transmissions unless specifically requested by the office staff - the steps to send a manual transmission are the same as when you paired your in-home monitor to your device for the first time.     Your device and monitor are wireless and most transmit cellularly, but please periodically check your monitor is still plugged in to the electrical outlet. If you still use the telephone land line to send, please ensure the connection to the phone scott is secure. This will help to ensure successful automatic transmissions in the future.     Please be aware that the remote device transmission sites are periodically monitored only during regular business hours during which simultaneous in-office device clinics are being conducted. If your transmission requires attention, we will contact you as soon as possible.    Your in-home monitor will do a full report on your device every month

## 2025-05-28 NOTE — PROGRESS NOTES
Incision is closed, clean, and dry. Two steri strips left intact w/ skin glue. No s/s of infection.    Patient education was provided about site care, device functionality, in home monitoring, and any other patient questions and/or concerns were addressed. Aftercare and remote monitoring literature was provided. Patient is to call with any signs or symptoms of infection. Patient voices understanding.

## 2025-06-04 DIAGNOSIS — I50.22 CHRONIC SYSTOLIC HEART FAILURE (HCC): Primary | ICD-10-CM

## 2025-06-04 NOTE — TELEPHONE ENCOUNTER
Med refill: pt stated Entresto was cancelled per Pharmacy    Entresto 24-26mg, Take 0.5 tablets by mouth 2 times daily, 60 tabs    [id number] - [place] [address] [address] [phone] F [phone] [[id number]]     Last ov: 4.24.25 NPRB  Next ov: 7.9.25 NPRB

## 2025-06-11 ENCOUNTER — OFFICE VISIT (OUTPATIENT)
Dept: CARDIOLOGY CLINIC | Age: 30
End: 2025-06-11
Payer: COMMERCIAL

## 2025-06-11 ENCOUNTER — CLINICAL SUPPORT (OUTPATIENT)
Dept: CARDIOLOGY CLINIC | Age: 30
End: 2025-06-11

## 2025-06-11 VITALS
SYSTOLIC BLOOD PRESSURE: 126 MMHG | HEART RATE: 68 BPM | BODY MASS INDEX: 29.75 KG/M2 | OXYGEN SATURATION: 99 % | DIASTOLIC BLOOD PRESSURE: 74 MMHG | WEIGHT: 178.57 LBS | HEIGHT: 65 IN

## 2025-06-11 DIAGNOSIS — Z45.09 ENCOUNTER FOR LOOP RECORDER CHECK: Primary | ICD-10-CM

## 2025-06-11 DIAGNOSIS — Z91.89 AT RISK FOR AMIODARONE TOXICITY WITH LONG TERM USE: ICD-10-CM

## 2025-06-11 DIAGNOSIS — Z92.89 HISTORY OF CARDIOVERSION: ICD-10-CM

## 2025-06-11 DIAGNOSIS — Z79.01 ANTICOAGULATED: ICD-10-CM

## 2025-06-11 DIAGNOSIS — I49.9 CARDIAC ARRHYTHMIA, UNSPECIFIED CARDIAC ARRHYTHMIA TYPE: ICD-10-CM

## 2025-06-11 DIAGNOSIS — I42.0 DILATED CARDIOMYOPATHY (HCC): ICD-10-CM

## 2025-06-11 DIAGNOSIS — I42.8 TACHYCARDIA INDUCED CARDIOMYOPATHY (HCC): ICD-10-CM

## 2025-06-11 DIAGNOSIS — I48.19 PERSISTENT ATRIAL FIBRILLATION WITH RAPID VENTRICULAR RESPONSE (HCC): Primary | ICD-10-CM

## 2025-06-11 DIAGNOSIS — Z79.899 AT RISK FOR AMIODARONE TOXICITY WITH LONG TERM USE: ICD-10-CM

## 2025-06-11 PROCEDURE — G8427 DOCREV CUR MEDS BY ELIG CLIN: HCPCS | Performed by: INTERNAL MEDICINE

## 2025-06-11 PROCEDURE — G8417 CALC BMI ABV UP PARAM F/U: HCPCS | Performed by: INTERNAL MEDICINE

## 2025-06-11 PROCEDURE — 99214 OFFICE O/P EST MOD 30 MIN: CPT | Performed by: INTERNAL MEDICINE

## 2025-06-11 PROCEDURE — 1036F TOBACCO NON-USER: CPT | Performed by: INTERNAL MEDICINE

## 2025-06-11 PROCEDURE — 93000 ELECTROCARDIOGRAM COMPLETE: CPT | Performed by: INTERNAL MEDICINE

## 2025-06-11 NOTE — PATIENT INSTRUCTIONS
PLAN:  - Discussed ablation for atrial fibrillation management.    - Discussed risks vs. Benefits.    - Provided general education.   - We will re discuss ablation at next office visit.   - Continue prescribed medications as ordered.   - Follow up with me in 3 months. Notify our office if you have worsening symptoms.

## 2025-06-11 NOTE — PROGRESS NOTES
CARDIAC ELECTROPHYSIOLOGY CONSULT NOTE  Date: 6/11/25  Patient Name: Moose Davila  YOB: 1995    Primary Care Physician: Nathaly Gayle    CHIEF COMPLAINT:   Chief Complaint   Patient presents with    Follow-Up from Hospital    Atrial Fibrillation    Tachycardia    Chest Pain     Moose Davila was seen today on 6/11/2025 in consultation due to chief complaint of atrial fibrillation.    Patient is a 29 y.o. male with PMH of atrial fibrillation, ASD, sinus tachycardia, palpitations, anxiety, depression who was seen today for atrial fibrillation.     In 1/2025 patient presented to the ED with complaints of palpitations. Upon presentation to the ED his heart rate was 200bpm. Vagal maneuvers were attempted which decreased the heart rate to the 150s transiently. He also received adenosine with no change in arrhythmia. He was started on diltiazem drip. On 1/7/2025 he underwent successful SARA/CV, SARA showed an LVEF of 25-30%. Echo 1/8/2025 demonstrated an LVEF of 45-50%. Patient was discharged wearing 30 day cardiac event monitor and with recommendation for follow up with heart failure team as outpatient.     Patient wore a cardiac event monitor from 1/8/2025 to 2/7/2025 which demonstrated predominately SR with an average HR of 76 () BPM, very rare PACs and PVCs. Cardiac MRI 3/24/2025 demonstrated an LVEF of 48%, no evidence of edema, infarction, or infiltration. Findings most consistent with a non-specific, non-ischemic cardiomyopathy.      Office visit, 4/11/2025, he reported he had been feeling overall well. He reported an occasional palpitation that was brief in duration. He reported a family history of atrial fibrillation/heart failure in his mother and grandmother. Patient denied current edema, chest pain, shortness of breath,  dizziness or syncope. Patient was taking all cardiac medications as prescribed and tolerates them well.     Interval History since last office visit:  Patient presented to the ED

## 2025-06-19 ENCOUNTER — OFFICE VISIT (OUTPATIENT)
Dept: FAMILY MEDICINE CLINIC | Age: 30
End: 2025-06-19
Payer: COMMERCIAL

## 2025-06-19 VITALS
HEIGHT: 66 IN | DIASTOLIC BLOOD PRESSURE: 72 MMHG | BODY MASS INDEX: 27.8 KG/M2 | SYSTOLIC BLOOD PRESSURE: 116 MMHG | WEIGHT: 173 LBS

## 2025-06-19 DIAGNOSIS — K64.9 HEMORRHOIDS, UNSPECIFIED HEMORRHOID TYPE: ICD-10-CM

## 2025-06-19 DIAGNOSIS — F33.0 MILD EPISODE OF RECURRENT MAJOR DEPRESSIVE DISORDER: ICD-10-CM

## 2025-06-19 DIAGNOSIS — I42.8 TACHYCARDIA INDUCED CARDIOMYOPATHY (HCC): ICD-10-CM

## 2025-06-19 DIAGNOSIS — I48.0 PAROXYSMAL ATRIAL FIBRILLATION (HCC): ICD-10-CM

## 2025-06-19 DIAGNOSIS — F41.9 ANXIETY: ICD-10-CM

## 2025-06-19 DIAGNOSIS — Z00.00 WELL ADULT EXAM: Primary | ICD-10-CM

## 2025-06-19 DIAGNOSIS — I42.9 FAMILIAL CARDIOMYOPATHY (HCC): ICD-10-CM

## 2025-06-19 DIAGNOSIS — I42.0 DILATED CARDIOMYOPATHY (HCC): ICD-10-CM

## 2025-06-19 PROCEDURE — G8427 DOCREV CUR MEDS BY ELIG CLIN: HCPCS | Performed by: STUDENT IN AN ORGANIZED HEALTH CARE EDUCATION/TRAINING PROGRAM

## 2025-06-19 PROCEDURE — 1036F TOBACCO NON-USER: CPT | Performed by: STUDENT IN AN ORGANIZED HEALTH CARE EDUCATION/TRAINING PROGRAM

## 2025-06-19 PROCEDURE — 99385 PREV VISIT NEW AGE 18-39: CPT | Performed by: STUDENT IN AN ORGANIZED HEALTH CARE EDUCATION/TRAINING PROGRAM

## 2025-06-19 PROCEDURE — 99204 OFFICE O/P NEW MOD 45 MIN: CPT | Performed by: STUDENT IN AN ORGANIZED HEALTH CARE EDUCATION/TRAINING PROGRAM

## 2025-06-19 PROCEDURE — G8417 CALC BMI ABV UP PARAM F/U: HCPCS | Performed by: STUDENT IN AN ORGANIZED HEALTH CARE EDUCATION/TRAINING PROGRAM

## 2025-06-19 SDOH — HEALTH STABILITY: PHYSICAL HEALTH: ON AVERAGE, HOW MANY MINUTES DO YOU ENGAGE IN EXERCISE AT THIS LEVEL?: 60 MIN

## 2025-06-19 SDOH — HEALTH STABILITY: PHYSICAL HEALTH: ON AVERAGE, HOW MANY DAYS PER WEEK DO YOU ENGAGE IN MODERATE TO STRENUOUS EXERCISE (LIKE A BRISK WALK)?: 3 DAYS

## 2025-06-19 ASSESSMENT — PATIENT HEALTH QUESTIONNAIRE - PHQ9
8. MOVING OR SPEAKING SO SLOWLY THAT OTHER PEOPLE COULD HAVE NOTICED. OR THE OPPOSITE, BEING SO FIGETY OR RESTLESS THAT YOU HAVE BEEN MOVING AROUND A LOT MORE THAN USUAL: NOT AT ALL
1. LITTLE INTEREST OR PLEASURE IN DOING THINGS: SEVERAL DAYS
9. THOUGHTS THAT YOU WOULD BE BETTER OFF DEAD, OR OF HURTING YOURSELF: SEVERAL DAYS
3. TROUBLE FALLING OR STAYING ASLEEP: NEARLY EVERY DAY
SUM OF ALL RESPONSES TO PHQ QUESTIONS 1-9: 10
6. FEELING BAD ABOUT YOURSELF - OR THAT YOU ARE A FAILURE OR HAVE LET YOURSELF OR YOUR FAMILY DOWN: SEVERAL DAYS
10. IF YOU CHECKED OFF ANY PROBLEMS, HOW DIFFICULT HAVE THESE PROBLEMS MADE IT FOR YOU TO DO YOUR WORK, TAKE CARE OF THINGS AT HOME, OR GET ALONG WITH OTHER PEOPLE: SOMEWHAT DIFFICULT
5. POOR APPETITE OR OVEREATING: NOT AT ALL
2. FEELING DOWN, DEPRESSED OR HOPELESS: NEARLY EVERY DAY
SUM OF ALL RESPONSES TO PHQ QUESTIONS 1-9: 11
SUM OF ALL RESPONSES TO PHQ QUESTIONS 1-9: 11
4. FEELING TIRED OR HAVING LITTLE ENERGY: SEVERAL DAYS
SUM OF ALL RESPONSES TO PHQ QUESTIONS 1-9: 11
7. TROUBLE CONCENTRATING ON THINGS, SUCH AS READING THE NEWSPAPER OR WATCHING TELEVISION: SEVERAL DAYS

## 2025-06-19 ASSESSMENT — COLUMBIA-SUICIDE SEVERITY RATING SCALE - C-SSRS
2. HAVE YOU ACTUALLY HAD ANY THOUGHTS OF KILLING YOURSELF?: YES
3. HAVE YOU BEEN THINKING ABOUT HOW YOU MIGHT KILL YOURSELF?: YES
4. HAVE YOU HAD THESE THOUGHTS AND HAD SOME INTENTION OF ACTING ON THEM?: NO
1. WITHIN THE PAST MONTH, HAVE YOU WISHED YOU WERE DEAD OR WISHED YOU COULD GO TO SLEEP AND NOT WAKE UP?: YES
6. HAVE YOU EVER DONE ANYTHING, STARTED TO DO ANYTHING, OR PREPARED TO DO ANYTHING TO END YOUR LIFE?: NO
5. HAVE YOU STARTED TO WORK OUT OR WORKED OUT THE DETAILS OF HOW TO KILL YOURSELF? DO YOU INTEND TO CARRY OUT THIS PLAN?: NO

## 2025-06-19 NOTE — PROGRESS NOTES
[facility] Medicine  Establish care visit   2025    Moose Davila (:  1995) is a 29 y.o. male, here to establish care.    Chief Complaint   Patient presents with    New Patient     Hemorrhoids        ASSESSMENT/ PLAN  1. Well adult exam  General wellness exam. Reviewed chart for past hx and updated today. Counseled on age appropriate health guidance and discussed screening recommendations. Vaccinations reviewed and discussed. All questions answered.    2. Hemorrhoids, unspecified hemorrhoid type  Chronic.  Not currently controlled.  Xarelto makes it worse.  Has started MiraLAX.  Reviewed MiraLAX use at this time.  Hold off on prescription medication at this time.    3. Mild episode of recurrent major depressive disorder  Chronic.  Uncontrolled.  Patient may benefit from therapy.  Will hold off on prescription medication at this time.    4. Anxiety  Chronic.  Uncontrolled.  Patient may benefit from therapy.  Will hold off on prescription medication at this time.    5. Paroxysmal atrial fibrillation (HCC)  6. Dilated cardiomyopathy (HCC)  7. Familial cardiomyopathy (HCC)  8. Tachycardia induced cardiomyopathy (HCC)  Chronic.  Stable.  Follows with cardiology.  On amiodarone, metoprolol, Xarelto, Entresto.  Does not need refill.  Continue at this time.       No follow-ups on file.    HPI  Patient is a 29-year-old male, who presents to clinic to establish care with a well adult visit and for interval follow-up for chronic condition management.    Patient is originally from [place].  He moved to the area in 2016.  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  He went to [employer].  He now lives in [place] in a house send he lives with his dog and a fenced yard.  It has been 4 to 5 years since he last saw his primary care provider.    Patient does have an allergy to amoxicillin which gives him a rash, no history of anaphylaxis to amoxicillin.  He does not follow any particular diet,

## 2025-07-09 ENCOUNTER — OFFICE VISIT (OUTPATIENT)
Dept: CARDIOLOGY CLINIC | Age: 30
End: 2025-07-09
Payer: COMMERCIAL

## 2025-07-09 VITALS
HEART RATE: 75 BPM | HEIGHT: 66 IN | SYSTOLIC BLOOD PRESSURE: 92 MMHG | DIASTOLIC BLOOD PRESSURE: 60 MMHG | BODY MASS INDEX: 27.97 KG/M2 | OXYGEN SATURATION: 99 % | WEIGHT: 174 LBS

## 2025-07-09 DIAGNOSIS — I42.0 DILATED CARDIOMYOPATHY (HCC): ICD-10-CM

## 2025-07-09 DIAGNOSIS — I50.22 CHRONIC SYSTOLIC CONGESTIVE HEART FAILURE (HCC): ICD-10-CM

## 2025-07-09 DIAGNOSIS — I42.8 TACHYCARDIA INDUCED CARDIOMYOPATHY (HCC): ICD-10-CM

## 2025-07-09 DIAGNOSIS — I48.0 PAF (PAROXYSMAL ATRIAL FIBRILLATION) (HCC): ICD-10-CM

## 2025-07-09 DIAGNOSIS — I50.22 CHRONIC SYSTOLIC CONGESTIVE HEART FAILURE (HCC): Primary | ICD-10-CM

## 2025-07-09 LAB
ANION GAP SERPL CALCULATED.3IONS-SCNC: 12 MMOL/L (ref 3–16)
BUN SERPL-MCNC: 12 MG/DL (ref 7–20)
CALCIUM SERPL-MCNC: 9.5 MG/DL (ref 8.3–10.6)
CHLORIDE SERPL-SCNC: 102 MMOL/L (ref 99–110)
CO2 SERPL-SCNC: 26 MMOL/L (ref 21–32)
CREAT SERPL-MCNC: 1.2 MG/DL (ref 0.9–1.3)
GFR SERPLBLD CREATININE-BSD FMLA CKD-EPI: 83 ML/MIN/{1.73_M2}
GLUCOSE SERPL-MCNC: 79 MG/DL (ref 70–99)
NT-PROBNP SERPL-MCNC: 38 PG/ML (ref 0–124)
POTASSIUM SERPL-SCNC: 4.8 MMOL/L (ref 3.5–5.1)
SODIUM SERPL-SCNC: 140 MMOL/L (ref 136–145)

## 2025-07-09 PROCEDURE — G8417 CALC BMI ABV UP PARAM F/U: HCPCS | Performed by: NURSE PRACTITIONER

## 2025-07-09 PROCEDURE — G8427 DOCREV CUR MEDS BY ELIG CLIN: HCPCS | Performed by: NURSE PRACTITIONER

## 2025-07-09 PROCEDURE — 1036F TOBACCO NON-USER: CPT | Performed by: NURSE PRACTITIONER

## 2025-07-09 PROCEDURE — 99214 OFFICE O/P EST MOD 30 MIN: CPT | Performed by: NURSE PRACTITIONER

## 2025-07-09 PROCEDURE — G2211 COMPLEX E/M VISIT ADD ON: HCPCS | Performed by: NURSE PRACTITIONER

## 2025-07-09 NOTE — PATIENT INSTRUCTIONS
Continue Entresto 1/2 tab of the 24/26mg Twice a day  Continue toprol current dose 1/2 tab   Check labs sometime this week   Follow up with EP as planned Sept.   Repeat TSH and LFTs in Oct.   Call to schedule limited echo 012-22-[facility] to evaluate EF   Follow up CHF team in Dec.

## 2025-07-09 NOTE — PROGRESS NOTES
[facility]  Cardiac Follow-up    Primary Care Doctor:  Donald Decker MD    Chief Complaint   Patient presents with    Follow-up    Cardiomyopathy    Irregular Heart Beat     Has loop- heart racing while laying      History of Present Illness:  I had the pleasure of seeing Moose Davila in follow up for tachycardia medicated cardiomyopathy    Admitted 1/7/25-1/9/25 with shortness of breath, palpitations, chest pressures. Found to be in RVR rates 190-200's requiring SARA/DCCV. LVEF on SARA 25%. Complete echo showed LVEF 45%.   admitted 4/15/25-4/17/25 with worse palptiations, hypotension, underwent DCCV for afib rate 180's started on amiodarone. LVEF on SARA 15%-25%.      Since last visit, maintains on amiodarone.   Some palpitations at night last night lasting 10-15 min and was occurring when he was laying down ranging HR 70-80's. Did have increase in sugar prior to bed last night.     Still deciding on the ablation.  Tolerating amiodarone.   No shortness of breath. No chest pain. No edema.   Continues to work.   Occasional lightheadedness with position changes, gets up slow.     Moose Davila describes symptoms including palpitations but denies chest pain, chest pressure/discomfort, syncope.     Taking medications as prescribed: Yes  Able to afford medications: Yes    Past Medical History:   has a past medical history of Anxiety, Atrial fibrillation (HCC), Cardiac arrhythmia due to congenital heart disease, and CHF (congestive heart failure) ([facility]).  Surgical History:   has a past surgical history that includes [place] tooth extraction (2016) and ep device procedure (N/A, 5/16/2025).   Social History:   reports that he has quit smoking. His smoking use included cigars and cigarettes. He has a 0.1 pack-year smoking history. He has been exposed to tobacco smoke. He has never used smokeless tobacco. He reports that he does not currently use alcohol. He reports that he does not currently use drugs after having

## 2025-07-14 ASSESSMENT — PATIENT HEALTH QUESTIONNAIRE - PHQ9
2. FEELING DOWN, DEPRESSED OR HOPELESS: MORE THAN HALF THE DAYS
3. TROUBLE FALLING OR STAYING ASLEEP: NEARLY EVERY DAY
SUM OF ALL RESPONSES TO PHQ QUESTIONS 1-9: 13
4. FEELING TIRED OR HAVING LITTLE ENERGY: SEVERAL DAYS
1. LITTLE INTEREST OR PLEASURE IN DOING THINGS: SEVERAL DAYS
SUM OF ALL RESPONSES TO PHQ QUESTIONS 1-9: 11
10. IF YOU CHECKED OFF ANY PROBLEMS, HOW DIFFICULT HAVE THESE PROBLEMS MADE IT FOR YOU TO DO YOUR WORK, TAKE CARE OF THINGS AT HOME, OR GET ALONG WITH OTHER PEOPLE: SOMEWHAT DIFFICULT
SUM OF ALL RESPONSES TO PHQ QUESTIONS 1-9: 13
8. MOVING OR SPEAKING SO SLOWLY THAT OTHER PEOPLE COULD HAVE NOTICED. OR THE OPPOSITE, BEING SO FIGETY OR RESTLESS THAT YOU HAVE BEEN MOVING AROUND A LOT MORE THAN USUAL: NOT AT ALL
4. FEELING TIRED OR HAVING LITTLE ENERGY: SEVERAL DAYS
2. FEELING DOWN, DEPRESSED OR HOPELESS: MORE THAN HALF THE DAYS
SUM OF ALL RESPONSES TO PHQ QUESTIONS 1-9: 13
5. POOR APPETITE OR OVEREATING: NOT AT ALL
1. LITTLE INTEREST OR PLEASURE IN DOING THINGS: SEVERAL DAYS
SUM OF ALL RESPONSES TO PHQ QUESTIONS 1-9: 13
3. TROUBLE FALLING OR STAYING ASLEEP: NEARLY EVERY DAY
5. POOR APPETITE OR OVEREATING: NOT AT ALL
7. TROUBLE CONCENTRATING ON THINGS, SUCH AS READING THE NEWSPAPER OR WATCHING TELEVISION: SEVERAL DAYS
9. THOUGHTS THAT YOU WOULD BE BETTER OFF DEAD, OR OF HURTING YOURSELF: MORE THAN HALF THE DAYS
10. IF YOU CHECKED OFF ANY PROBLEMS, HOW DIFFICULT HAVE THESE PROBLEMS MADE IT FOR YOU TO DO YOUR WORK, TAKE CARE OF THINGS AT HOME, OR GET ALONG WITH OTHER PEOPLE: SOMEWHAT DIFFICULT
6. FEELING BAD ABOUT YOURSELF - OR THAT YOU ARE A FAILURE OR HAVE LET YOURSELF OR YOUR FAMILY DOWN: NEARLY EVERY DAY
7. TROUBLE CONCENTRATING ON THINGS, SUCH AS READING THE NEWSPAPER OR WATCHING TELEVISION: SEVERAL DAYS
6. FEELING BAD ABOUT YOURSELF - OR THAT YOU ARE A FAILURE OR HAVE LET YOURSELF OR YOUR FAMILY DOWN: NEARLY EVERY DAY
9. THOUGHTS THAT YOU WOULD BE BETTER OFF DEAD, OR OF HURTING YOURSELF: MORE THAN HALF THE DAYS
8. MOVING OR SPEAKING SO SLOWLY THAT OTHER PEOPLE COULD HAVE NOTICED. OR THE OPPOSITE - BEING SO FIDGETY OR RESTLESS THAT YOU HAVE BEEN MOVING AROUND A LOT MORE THAN USUAL: NOT AT ALL

## 2025-07-14 ASSESSMENT — COLUMBIA-SUICIDE SEVERITY RATING SCALE - C-SSRS
1. IN THE PAST MONTH, HAVE YOU WISHED YOU WERE DEAD OR WISHED YOU COULD GO TO SLEEP AND NOT WAKE UP?: YES
6. IN YOUR LIFETIME, HAVE YOU EVER DONE ANYTHING, STARTED TO DO ANYTHING, OR PREPARED TO DO ANYTHING TO END YOUR LIFE?: NO
2. IN THE PAST MONTH, HAVE YOU ACTUALLY HAD ANY THOUGHTS OF KILLING YOURSELF?: NO

## 2025-07-14 ASSESSMENT — ANXIETY QUESTIONNAIRES
4. TROUBLE RELAXING: MORE THAN HALF THE DAYS
6. BECOMING EASILY ANNOYED OR IRRITABLE: MORE THAN HALF THE DAYS
1. FEELING NERVOUS, ANXIOUS, OR ON EDGE: MORE THAN HALF THE DAYS
IF YOU CHECKED OFF ANY PROBLEMS ON THIS QUESTIONNAIRE, HOW DIFFICULT HAVE THESE PROBLEMS MADE IT FOR YOU TO DO YOUR WORK, TAKE CARE OF THINGS AT HOME, OR GET ALONG WITH OTHER PEOPLE: SOMEWHAT DIFFICULT
2. NOT BEING ABLE TO STOP OR CONTROL WORRYING: NEARLY EVERY DAY
4. TROUBLE RELAXING: MORE THAN HALF THE DAYS
1. FEELING NERVOUS, ANXIOUS, OR ON EDGE: MORE THAN HALF THE DAYS
3. WORRYING TOO MUCH ABOUT DIFFERENT THINGS: NEARLY EVERY DAY
IF YOU CHECKED OFF ANY PROBLEMS ON THIS QUESTIONNAIRE, HOW DIFFICULT HAVE THESE PROBLEMS MADE IT FOR YOU TO DO YOUR WORK, TAKE CARE OF THINGS AT HOME, OR GET ALONG WITH OTHER PEOPLE: SOMEWHAT DIFFICULT
2. NOT BEING ABLE TO STOP OR CONTROL WORRYING: NEARLY EVERY DAY
7. FEELING AFRAID AS IF SOMETHING AWFUL MIGHT HAPPEN: MORE THAN HALF THE DAYS
7. FEELING AFRAID AS IF SOMETHING AWFUL MIGHT HAPPEN: MORE THAN HALF THE DAYS
5. BEING SO RESTLESS THAT IT IS HARD TO SIT STILL: SEVERAL DAYS
6. BECOMING EASILY ANNOYED OR IRRITABLE: MORE THAN HALF THE DAYS
GAD7 TOTAL SCORE: 15
3. WORRYING TOO MUCH ABOUT DIFFERENT THINGS: NEARLY EVERY DAY
5. BEING SO RESTLESS THAT IT IS HARD TO SIT STILL: SEVERAL DAYS

## 2025-07-14 ASSESSMENT — LIFESTYLE VARIABLES
HAVE YOU EVER RECEIVED ALCOHOL OR OTHER DRUG ABUSE TREATMENT: NO
PAST THREE MONTHS WHAT IS THE LARGEST AMOUNT OF ALCOHOLIC DRINKS YOU HAVE CONSUMED IN ONE DAY: 0
HISTORY_ALCOHOL_USE: YES
ALCOHOL_DAYS_PER_WEEK: 0

## 2025-07-15 ENCOUNTER — OFFICE VISIT (OUTPATIENT)
Dept: PSYCHOLOGY | Age: 30
End: 2025-07-15
Payer: COMMERCIAL

## 2025-07-15 DIAGNOSIS — F41.1 GAD (GENERALIZED ANXIETY DISORDER): Primary | ICD-10-CM

## 2025-07-15 DIAGNOSIS — F32.A DEPRESSION, UNSPECIFIED DEPRESSION TYPE: ICD-10-CM

## 2025-07-15 PROCEDURE — 90791 PSYCH DIAGNOSTIC EVALUATION: CPT | Performed by: PSYCHOLOGIST

## 2025-07-15 PROCEDURE — 1036F TOBACCO NON-USER: CPT | Performed by: PSYCHOLOGIST

## 2025-07-15 NOTE — PROGRESS NOTES
Behavioral Health Consultation  Lovely Cabrera, Ph.D.  Psychologist  7/15/2025  3:39 PM EDT      Time spent with Patient: 25 minutes  This is patient's first [facility] appointment.    Reason for Consult:    Chief Complaint   Patient presents with    Depression    Anxiety     Referring Provider: Donald Decker MD  [address] [place] [address]    Pt provided informed consent for the behavioral health program. Discussed with patient model of service to include the limits of confidentiality (i.e. abuse reporting, suicide intervention, etc.) and short-term intervention focused approach. Pt indicated understanding.  Feedback given to PCP.    S:  Please note that Kevin Nicolas MA was present for and participated in appointment today for training purposes.  Pt consented to such.    Patient presents with concerns about depression and anxiety. Sx have been present for most of his life, but have been worse in the last 6 months. Sx are aggravated by recent health concerns, death of mother last year. Patient finds relief from distraction. Goals for treatment include increasing social contact, improving his mood.    Patient lives with his dog. Patient works from home full-time for a [employer] company. He has been at the job for about 3 years. In previous job, traveled for work regularly. Daily routine is fairly consistent. Daily caffeine use includes up to 2 cups of 1/2 caff coffee and 1-2 cans of Mt. Dew. Uses nicotine pouches daily, no alcohol use since April, no THC since April, no illegal drug use. Patient spends most of his time playing video games or on social media. There is some regular exercise (walking pad a few times a week). Patient describes significant difficulty initiating sleep. Social support is good. Patient enjoys the following hobbies: video games, fishing, cooking. Family history is positive for depression (mother). Patient has engaged in therapy and seen psychiatry before, with mixed benefit.

## 2025-07-15 NOTE — PATIENT INSTRUCTIONS
rate   Headache   Weight gain   Nausea   Constipation   Dry mouth   Muscle twitching  Weight loss   Low energy   Weakness   Tight chest   Diarrhea   Dizziness   Trembling   Stomach cramps  Chills    Hot flashes   Sweating   Pounding heart  Choking feeling  Chest pain   Leg cramps   Numb hands/feet Dry throat   Appetite change  Face flushing   ? Blood pressure  Light-headedness  Feeling faint       Troubleswallowing   Rash ?   Urination   Neck pain     Tingling hands/feet     Emotional and Thought Responses   Restlessness   Agitation   Insecurity            Worthlessness   Anxiety   Stress    Depression            Hopelessness   Guilt    Defensiveness  Anger           Racing thoughts   Nightmares   Intense thinking  Sensitivity          Expecting the worst   Numbness   Lack of motivation  Mood swings             Forgetfulness   ? Concentration  Rigidity              Preoccupation  Intolerance     Behavioral Responses   Avoidance   Withdrawal   Neglect   ? Alcohol use    Smoking   ? Eating   Arguing       Poor appearance   ? Spending   Poor hygiene   ? Eating  Seeking reassurance   Nail biting   Skin picking   ? Talking        ? Body checking   Sexual problems  Foot tapping  Fidgeting Rapid walking    ? Exercise   Teeth clenching           Multitasking  Aggressive speaking       ? Fun activities  ? Sleeping      ? Relaxing activities     Seeking information     The parasympathetic nervous system in your body is designed to turn on your body’s relaxation response. Your behaviors and thinking can keep your body’s natural relaxation response from operating at its best.   Getting your body to relax on a daily basis for at least brief periods can help decrease unpleasant stress responses. Learning to relax your body, through specific breathing and relaxation exercises as well as by minimizing stressful thinking, can help your body’s natural relaxation system be more effective.     
Detail Level: Generalized

## 2025-07-29 ASSESSMENT — ANXIETY QUESTIONNAIRES
IF YOU CHECKED OFF ANY PROBLEMS ON THIS QUESTIONNAIRE, HOW DIFFICULT HAVE THESE PROBLEMS MADE IT FOR YOU TO DO YOUR WORK, TAKE CARE OF THINGS AT HOME, OR GET ALONG WITH OTHER PEOPLE: SOMEWHAT DIFFICULT
GAD7 TOTAL SCORE: 11
3. WORRYING TOO MUCH ABOUT DIFFERENT THINGS: NEARLY EVERY DAY
5. BEING SO RESTLESS THAT IT IS HARD TO SIT STILL: NOT AT ALL
7. FEELING AFRAID AS IF SOMETHING AWFUL MIGHT HAPPEN: NEARLY EVERY DAY
5. BEING SO RESTLESS THAT IT IS HARD TO SIT STILL: NOT AT ALL
3. WORRYING TOO MUCH ABOUT DIFFERENT THINGS: NEARLY EVERY DAY
2. NOT BEING ABLE TO STOP OR CONTROL WORRYING: SEVERAL DAYS
1. FEELING NERVOUS, ANXIOUS, OR ON EDGE: MORE THAN HALF THE DAYS
2. NOT BEING ABLE TO STOP OR CONTROL WORRYING: SEVERAL DAYS
6. BECOMING EASILY ANNOYED OR IRRITABLE: SEVERAL DAYS
4. TROUBLE RELAXING: SEVERAL DAYS
1. FEELING NERVOUS, ANXIOUS, OR ON EDGE: MORE THAN HALF THE DAYS
6. BECOMING EASILY ANNOYED OR IRRITABLE: SEVERAL DAYS
7. FEELING AFRAID AS IF SOMETHING AWFUL MIGHT HAPPEN: NEARLY EVERY DAY
4. TROUBLE RELAXING: SEVERAL DAYS
IF YOU CHECKED OFF ANY PROBLEMS ON THIS QUESTIONNAIRE, HOW DIFFICULT HAVE THESE PROBLEMS MADE IT FOR YOU TO DO YOUR WORK, TAKE CARE OF THINGS AT HOME, OR GET ALONG WITH OTHER PEOPLE: SOMEWHAT DIFFICULT

## 2025-07-29 ASSESSMENT — PATIENT HEALTH QUESTIONNAIRE - PHQ9
10. IF YOU CHECKED OFF ANY PROBLEMS, HOW DIFFICULT HAVE THESE PROBLEMS MADE IT FOR YOU TO DO YOUR WORK, TAKE CARE OF THINGS AT HOME, OR GET ALONG WITH OTHER PEOPLE: SOMEWHAT DIFFICULT
10. IF YOU CHECKED OFF ANY PROBLEMS, HOW DIFFICULT HAVE THESE PROBLEMS MADE IT FOR YOU TO DO YOUR WORK, TAKE CARE OF THINGS AT HOME, OR GET ALONG WITH OTHER PEOPLE: SOMEWHAT DIFFICULT
4. FEELING TIRED OR HAVING LITTLE ENERGY: SEVERAL DAYS
6. FEELING BAD ABOUT YOURSELF - OR THAT YOU ARE A FAILURE OR HAVE LET YOURSELF OR YOUR FAMILY DOWN: MORE THAN HALF THE DAYS
3. TROUBLE FALLING OR STAYING ASLEEP: NEARLY EVERY DAY
1. LITTLE INTEREST OR PLEASURE IN DOING THINGS: MORE THAN HALF THE DAYS
7. TROUBLE CONCENTRATING ON THINGS, SUCH AS READING THE NEWSPAPER OR WATCHING TELEVISION: NOT AT ALL
SUM OF ALL RESPONSES TO PHQ QUESTIONS 1-9: 11
SUM OF ALL RESPONSES TO PHQ QUESTIONS 1-9: 11
5. POOR APPETITE OR OVEREATING: NOT AT ALL
4. FEELING TIRED OR HAVING LITTLE ENERGY: SEVERAL DAYS
9. THOUGHTS THAT YOU WOULD BE BETTER OFF DEAD, OR OF HURTING YOURSELF: SEVERAL DAYS
1. LITTLE INTEREST OR PLEASURE IN DOING THINGS: MORE THAN HALF THE DAYS
SUM OF ALL RESPONSES TO PHQ QUESTIONS 1-9: 11
SUM OF ALL RESPONSES TO PHQ QUESTIONS 1-9: 11
2. FEELING DOWN, DEPRESSED OR HOPELESS: MORE THAN HALF THE DAYS
2. FEELING DOWN, DEPRESSED OR HOPELESS: MORE THAN HALF THE DAYS
9. THOUGHTS THAT YOU WOULD BE BETTER OFF DEAD, OR OF HURTING YOURSELF: SEVERAL DAYS
6. FEELING BAD ABOUT YOURSELF - OR THAT YOU ARE A FAILURE OR HAVE LET YOURSELF OR YOUR FAMILY DOWN: MORE THAN HALF THE DAYS
8. MOVING OR SPEAKING SO SLOWLY THAT OTHER PEOPLE COULD HAVE NOTICED. OR THE OPPOSITE, BEING SO FIGETY OR RESTLESS THAT YOU HAVE BEEN MOVING AROUND A LOT MORE THAN USUAL: NOT AT ALL
5. POOR APPETITE OR OVEREATING: NOT AT ALL
8. MOVING OR SPEAKING SO SLOWLY THAT OTHER PEOPLE COULD HAVE NOTICED. OR THE OPPOSITE - BEING SO FIDGETY OR RESTLESS THAT YOU HAVE BEEN MOVING AROUND A LOT MORE THAN USUAL: NOT AT ALL
SUM OF ALL RESPONSES TO PHQ QUESTIONS 1-9: 10
3. TROUBLE FALLING OR STAYING ASLEEP: NEARLY EVERY DAY
7. TROUBLE CONCENTRATING ON THINGS, SUCH AS READING THE NEWSPAPER OR WATCHING TELEVISION: NOT AT ALL

## 2025-07-29 ASSESSMENT — COLUMBIA-SUICIDE SEVERITY RATING SCALE - C-SSRS
2. IN THE PAST MONTH, HAVE YOU ACTUALLY HAD ANY THOUGHTS OF KILLING YOURSELF?: NO
6. IN YOUR LIFETIME, HAVE YOU EVER DONE ANYTHING, STARTED TO DO ANYTHING, OR PREPARED TO DO ANYTHING TO END YOUR LIFE?: NO
1. IN THE PAST MONTH, HAVE YOU WISHED YOU WERE DEAD OR WISHED YOU COULD GO TO SLEEP AND NOT WAKE UP?: YES

## 2025-07-30 ENCOUNTER — OFFICE VISIT (OUTPATIENT)
Dept: PSYCHOLOGY | Age: 30
End: 2025-07-30
Payer: COMMERCIAL

## 2025-07-30 DIAGNOSIS — F41.1 GAD (GENERALIZED ANXIETY DISORDER): Primary | ICD-10-CM

## 2025-07-30 DIAGNOSIS — F32.A DEPRESSION, UNSPECIFIED DEPRESSION TYPE: ICD-10-CM

## 2025-07-30 PROCEDURE — 1036F TOBACCO NON-USER: CPT | Performed by: PSYCHOLOGIST

## 2025-07-30 PROCEDURE — 90832 PSYTX W PT 30 MINUTES: CPT | Performed by: PSYCHOLOGIST

## 2025-07-30 NOTE — PATIENT INSTRUCTIONS
Practice relaxation exercises daily, at bedtime if possible. You may want to set an alarm on your phone to remind you do to these. You could also download an edu to assist with your relaxation practice. Suggestions include \"Calm\" or \"Headspace.\" You can also search YouTube for \"progressive muscle relaxation\" or \"diaphragmatic breathing.\"  Return to see Dr. Cabrera in 2 weeks.      Deep Breathing     What Is Deep Breathing?   Deep breathing involves using your diaphragm muscle to help bring about a state of physiological relaxation. The diaphragm is a large muscle that rests across the bottom of your rib cage. When you inhale, the diaphragm muscle drops, opening up space so air can come in. When watching someone do this it looks like your stomach is filling with air. This type of breathing helps activate the part of your nervous system that controls relaxation. It can lead to decreased heart rate, blood pressure, decreased muscle tension, and overall feelings of relaxation.      Why Be Concerned With How I’m Breathing?    To increase your awareness of the role that breathing plays in increased physical tension and in contributing to increasing your body’s stress response.    To lower your level of stress-related arousal and tension.    To give you a method of taking calm, relaxing breaths to break the cycle of increasing arousal during stressful situations.     What Is the Best Way To Use Deep Breathing Exercises?    Use deep breathing frequently.    Take deep breaths at the first signs of stress, anxiety, physical tension, or other symptoms.    Schedule time for relaxation. My scheduled time for deep breathing will be bedtime.                                             Relaxation:  Diaphragmatic Breathing             ______________________________________________________________________________    1.  Sit in a comfortable position  2.  Place one hand on your stomach and the other on your chest  3.  Try to breathe so

## 2025-07-30 NOTE — PROGRESS NOTES
Behavioral Health Consultation  Lovely Cabrera, Ph.D.  Psychologist  7/30/2025  4:38 PM EDT      Time spent with Patient: 25 minutes  This is patient's second  [facility] appointment.    Reason for Consult:    Chief Complaint   Patient presents with    Anxiety    Depression     Referring Provider: Donald Decker MD  [address] [place] [address]    Feedback given to PCP.    S:  Patient reported that the last few weeks have been \"more of the same.\" Feels like anxiety drives his depressed mood. Reviewed and recommended relaxation training.     O:  MSE:    Appearance: good hygiene   Attitude: cooperative and friendly  Consciousness: alert  Orientation: oriented to person, place, time, general circumstance  Memory: recent and remote memory intact  Attention/Concentration: intact during session  Psychomotor Activity:normal  Eye Contact: normal  Speech: normal rate and volume, well-articulated  Mood: anxious  Affect: flat  Perception: within normal limits  Thought Content: within normal limits  Thought Process: logical, coherent and goal-directed  Insight: good  Judgment: intact  Ability to understand instructions: Yes  Ability to respond meaningfully: Yes  Morbid Ideation: no   Suicide Assessment: no suicidal ideation, plan, or intent  Homicidal Ideation: no    History:    Medications:   Current Outpatient Medications   Medication Sig Dispense Refill    sacubitril-valsartan (ENTRESTO) 24-26 MG per tablet Take 0.5 tablets by mouth 2 times daily 90 tablet 1    amiodarone (CORDARONE) 200 MG tablet Take 1 tablet by mouth daily 30 tablet 3    metoprolol succinate (TOPROL XL) 25 MG extended release tablet Take 0.5 tablets by mouth in the morning and at bedtime 90 tablet 3    rivaroxaban (XARELTO) 20 MG TABS tablet Take 1 tablet by mouth daily 90 tablet 3    diphenhydrAMINE-APAP, sleep, (TYLENOL PM EXTRA STRENGTH PO) Take by mouth as needed       No current facility-administered medications for this visit.     Social History:

## 2025-07-31 PROCEDURE — 93298 REM INTERROG DEV EVAL SCRMS: CPT | Performed by: INTERNAL MEDICINE

## 2025-08-13 ENCOUNTER — OFFICE VISIT (OUTPATIENT)
Dept: PSYCHOLOGY | Age: 30
End: 2025-08-13
Payer: COMMERCIAL

## 2025-08-13 DIAGNOSIS — F32.A DEPRESSION, UNSPECIFIED DEPRESSION TYPE: ICD-10-CM

## 2025-08-13 DIAGNOSIS — F41.1 GAD (GENERALIZED ANXIETY DISORDER): Primary | ICD-10-CM

## 2025-08-13 PROCEDURE — 90832 PSYTX W PT 30 MINUTES: CPT | Performed by: PSYCHOLOGIST

## 2025-08-13 PROCEDURE — 1036F TOBACCO NON-USER: CPT | Performed by: PSYCHOLOGIST

## 2025-08-13 ASSESSMENT — PATIENT HEALTH QUESTIONNAIRE - PHQ9
9. THOUGHTS THAT YOU WOULD BE BETTER OFF DEAD, OR OF HURTING YOURSELF: NEARLY EVERY DAY
10. IF YOU CHECKED OFF ANY PROBLEMS, HOW DIFFICULT HAVE THESE PROBLEMS MADE IT FOR YOU TO DO YOUR WORK, TAKE CARE OF THINGS AT HOME, OR GET ALONG WITH OTHER PEOPLE: SOMEWHAT DIFFICULT
6. FEELING BAD ABOUT YOURSELF - OR THAT YOU ARE A FAILURE OR HAVE LET YOURSELF OR YOUR FAMILY DOWN: NEARLY EVERY DAY
8. MOVING OR SPEAKING SO SLOWLY THAT OTHER PEOPLE COULD HAVE NOTICED. OR THE OPPOSITE, BEING SO FIGETY OR RESTLESS THAT YOU HAVE BEEN MOVING AROUND A LOT MORE THAN USUAL: SEVERAL DAYS
SUM OF ALL RESPONSES TO PHQ QUESTIONS 1-9: 16
SUM OF ALL RESPONSES TO PHQ QUESTIONS 1-9: 19
5. POOR APPETITE OR OVEREATING: NOT AT ALL
SUM OF ALL RESPONSES TO PHQ QUESTIONS 1-9: 19
SUM OF ALL RESPONSES TO PHQ QUESTIONS 1-9: 19
1. LITTLE INTEREST OR PLEASURE IN DOING THINGS: MORE THAN HALF THE DAYS
7. TROUBLE CONCENTRATING ON THINGS, SUCH AS READING THE NEWSPAPER OR WATCHING TELEVISION: SEVERAL DAYS
3. TROUBLE FALLING OR STAYING ASLEEP: NEARLY EVERY DAY
4. FEELING TIRED OR HAVING LITTLE ENERGY: NEARLY EVERY DAY
2. FEELING DOWN, DEPRESSED OR HOPELESS: NEARLY EVERY DAY

## 2025-08-25 ASSESSMENT — ANXIETY QUESTIONNAIRES
IF YOU CHECKED OFF ANY PROBLEMS ON THIS QUESTIONNAIRE, HOW DIFFICULT HAVE THESE PROBLEMS MADE IT FOR YOU TO DO YOUR WORK, TAKE CARE OF THINGS AT HOME, OR GET ALONG WITH OTHER PEOPLE: SOMEWHAT DIFFICULT
6. BECOMING EASILY ANNOYED OR IRRITABLE: SEVERAL DAYS
7. FEELING AFRAID AS IF SOMETHING AWFUL MIGHT HAPPEN: NEARLY EVERY DAY
1. FEELING NERVOUS, ANXIOUS, OR ON EDGE: MORE THAN HALF THE DAYS
1. FEELING NERVOUS, ANXIOUS, OR ON EDGE: MORE THAN HALF THE DAYS
2. NOT BEING ABLE TO STOP OR CONTROL WORRYING: NEARLY EVERY DAY
GAD7 TOTAL SCORE: 13
7. FEELING AFRAID AS IF SOMETHING AWFUL MIGHT HAPPEN: NEARLY EVERY DAY
5. BEING SO RESTLESS THAT IT IS HARD TO SIT STILL: NOT AT ALL
5. BEING SO RESTLESS THAT IT IS HARD TO SIT STILL: NOT AT ALL
2. NOT BEING ABLE TO STOP OR CONTROL WORRYING: NEARLY EVERY DAY
4. TROUBLE RELAXING: SEVERAL DAYS
4. TROUBLE RELAXING: SEVERAL DAYS
3. WORRYING TOO MUCH ABOUT DIFFERENT THINGS: NEARLY EVERY DAY
6. BECOMING EASILY ANNOYED OR IRRITABLE: SEVERAL DAYS
IF YOU CHECKED OFF ANY PROBLEMS ON THIS QUESTIONNAIRE, HOW DIFFICULT HAVE THESE PROBLEMS MADE IT FOR YOU TO DO YOUR WORK, TAKE CARE OF THINGS AT HOME, OR GET ALONG WITH OTHER PEOPLE: SOMEWHAT DIFFICULT
3. WORRYING TOO MUCH ABOUT DIFFERENT THINGS: NEARLY EVERY DAY

## 2025-08-25 ASSESSMENT — PATIENT HEALTH QUESTIONNAIRE - PHQ9
4. FEELING TIRED OR HAVING LITTLE ENERGY: SEVERAL DAYS
SUM OF ALL RESPONSES TO PHQ QUESTIONS 1-9: 9
SUM OF ALL RESPONSES TO PHQ QUESTIONS 1-9: 9
10. IF YOU CHECKED OFF ANY PROBLEMS, HOW DIFFICULT HAVE THESE PROBLEMS MADE IT FOR YOU TO DO YOUR WORK, TAKE CARE OF THINGS AT HOME, OR GET ALONG WITH OTHER PEOPLE: SOMEWHAT DIFFICULT
5. POOR APPETITE OR OVEREATING: NOT AT ALL
8. MOVING OR SPEAKING SO SLOWLY THAT OTHER PEOPLE COULD HAVE NOTICED. OR THE OPPOSITE, BEING SO FIGETY OR RESTLESS THAT YOU HAVE BEEN MOVING AROUND A LOT MORE THAN USUAL: NOT AT ALL
1. LITTLE INTEREST OR PLEASURE IN DOING THINGS: SEVERAL DAYS
SUM OF ALL RESPONSES TO PHQ QUESTIONS 1-9: 8
4. FEELING TIRED OR HAVING LITTLE ENERGY: SEVERAL DAYS
2. FEELING DOWN, DEPRESSED OR HOPELESS: MORE THAN HALF THE DAYS
2. FEELING DOWN, DEPRESSED OR HOPELESS: MORE THAN HALF THE DAYS
9. THOUGHTS THAT YOU WOULD BE BETTER OFF DEAD, OR OF HURTING YOURSELF: SEVERAL DAYS
SUM OF ALL RESPONSES TO PHQ QUESTIONS 1-9: 9
7. TROUBLE CONCENTRATING ON THINGS, SUCH AS READING THE NEWSPAPER OR WATCHING TELEVISION: NOT AT ALL
6. FEELING BAD ABOUT YOURSELF - OR THAT YOU ARE A FAILURE OR HAVE LET YOURSELF OR YOUR FAMILY DOWN: MORE THAN HALF THE DAYS
10. IF YOU CHECKED OFF ANY PROBLEMS, HOW DIFFICULT HAVE THESE PROBLEMS MADE IT FOR YOU TO DO YOUR WORK, TAKE CARE OF THINGS AT HOME, OR GET ALONG WITH OTHER PEOPLE: SOMEWHAT DIFFICULT
6. FEELING BAD ABOUT YOURSELF - OR THAT YOU ARE A FAILURE OR HAVE LET YOURSELF OR YOUR FAMILY DOWN: MORE THAN HALF THE DAYS
SUM OF ALL RESPONSES TO PHQ QUESTIONS 1-9: 9
8. MOVING OR SPEAKING SO SLOWLY THAT OTHER PEOPLE COULD HAVE NOTICED. OR THE OPPOSITE - BEING SO FIDGETY OR RESTLESS THAT YOU HAVE BEEN MOVING AROUND A LOT MORE THAN USUAL: NOT AT ALL
3. TROUBLE FALLING OR STAYING ASLEEP: MORE THAN HALF THE DAYS
7. TROUBLE CONCENTRATING ON THINGS, SUCH AS READING THE NEWSPAPER OR WATCHING TELEVISION: NOT AT ALL
9. THOUGHTS THAT YOU WOULD BE BETTER OFF DEAD, OR OF HURTING YOURSELF: SEVERAL DAYS
5. POOR APPETITE OR OVEREATING: NOT AT ALL
1. LITTLE INTEREST OR PLEASURE IN DOING THINGS: SEVERAL DAYS
3. TROUBLE FALLING OR STAYING ASLEEP: MORE THAN HALF THE DAYS

## 2025-08-28 ENCOUNTER — OFFICE VISIT (OUTPATIENT)
Dept: PSYCHOLOGY | Age: 30
End: 2025-08-28
Payer: COMMERCIAL

## 2025-08-28 DIAGNOSIS — F32.A DEPRESSION, UNSPECIFIED DEPRESSION TYPE: ICD-10-CM

## 2025-08-28 DIAGNOSIS — F41.1 GAD (GENERALIZED ANXIETY DISORDER): Primary | ICD-10-CM

## 2025-08-28 PROCEDURE — 1036F TOBACCO NON-USER: CPT | Performed by: PSYCHOLOGIST

## 2025-08-28 PROCEDURE — 90832 PSYTX W PT 30 MINUTES: CPT | Performed by: PSYCHOLOGIST

## (undated) PROCEDURE — B24BZZ4 ULTRASONOGRAPHY OF HEART WITH AORTA, TRANSESOPHAGEAL: ICD-10-PCS

## (undated) PROCEDURE — 5A2204Z RESTORATION OF CARDIAC RHYTHM, SINGLE: ICD-10-PCS

## (undated) DEVICE — Device

## (undated) DEVICE — PACEMAKER PACK: Brand: MEDLINE INDUSTRIES, INC.